# Patient Record
Sex: FEMALE | Race: ASIAN | NOT HISPANIC OR LATINO
[De-identification: names, ages, dates, MRNs, and addresses within clinical notes are randomized per-mention and may not be internally consistent; named-entity substitution may affect disease eponyms.]

---

## 2023-10-25 PROBLEM — Z00.00 ENCOUNTER FOR PREVENTIVE HEALTH EXAMINATION: Status: ACTIVE | Noted: 2023-10-25

## 2023-10-30 ENCOUNTER — APPOINTMENT (OUTPATIENT)
Dept: OBGYN | Facility: CLINIC | Age: 28
End: 2023-10-30
Payer: MEDICAID

## 2023-10-30 ENCOUNTER — ASOB RESULT (OUTPATIENT)
Age: 28
End: 2023-10-30

## 2023-10-30 PROCEDURE — 76817 TRANSVAGINAL US OBSTETRIC: CPT

## 2023-12-10 ENCOUNTER — EMERGENCY (EMERGENCY)
Facility: HOSPITAL | Age: 28
LOS: 1 days | Discharge: HOME | End: 2023-12-10
Attending: EMERGENCY MEDICINE
Payer: MEDICAID

## 2023-12-10 VITALS
SYSTOLIC BLOOD PRESSURE: 102 MMHG | DIASTOLIC BLOOD PRESSURE: 68 MMHG | RESPIRATION RATE: 18 BRPM | TEMPERATURE: 98 F | OXYGEN SATURATION: 98 % | HEART RATE: 85 BPM

## 2023-12-10 VITALS
DIASTOLIC BLOOD PRESSURE: 77 MMHG | HEIGHT: 62 IN | HEART RATE: 95 BPM | WEIGHT: 145.06 LBS | RESPIRATION RATE: 20 BRPM | OXYGEN SATURATION: 99 % | TEMPERATURE: 98 F | SYSTOLIC BLOOD PRESSURE: 107 MMHG

## 2023-12-10 LAB
ALBUMIN SERPL ELPH-MCNC: 4.2 G/DL — SIGNIFICANT CHANGE UP (ref 3.3–5)
ALBUMIN SERPL ELPH-MCNC: 4.2 G/DL — SIGNIFICANT CHANGE UP (ref 3.3–5)
ALP SERPL-CCNC: 57 U/L — SIGNIFICANT CHANGE UP (ref 40–120)
ALP SERPL-CCNC: 57 U/L — SIGNIFICANT CHANGE UP (ref 40–120)
ALT FLD-CCNC: 54 U/L — HIGH (ref 10–45)
ALT FLD-CCNC: 54 U/L — HIGH (ref 10–45)
ANION GAP SERPL CALC-SCNC: 14 MMOL/L — SIGNIFICANT CHANGE UP (ref 5–17)
ANION GAP SERPL CALC-SCNC: 14 MMOL/L — SIGNIFICANT CHANGE UP (ref 5–17)
APPEARANCE UR: ABNORMAL
APPEARANCE UR: ABNORMAL
APTT BLD: 27.8 SEC — SIGNIFICANT CHANGE UP (ref 24.5–35.6)
APTT BLD: 27.8 SEC — SIGNIFICANT CHANGE UP (ref 24.5–35.6)
AST SERPL-CCNC: 34 U/L — SIGNIFICANT CHANGE UP (ref 10–40)
AST SERPL-CCNC: 34 U/L — SIGNIFICANT CHANGE UP (ref 10–40)
BACTERIA # UR AUTO: ABNORMAL /HPF
BACTERIA # UR AUTO: ABNORMAL /HPF
BASE EXCESS BLDV CALC-SCNC: -3 MMOL/L — LOW (ref -2–3)
BASE EXCESS BLDV CALC-SCNC: -3 MMOL/L — LOW (ref -2–3)
BASOPHILS # BLD AUTO: 0.04 K/UL — SIGNIFICANT CHANGE UP (ref 0–0.2)
BASOPHILS # BLD AUTO: 0.04 K/UL — SIGNIFICANT CHANGE UP (ref 0–0.2)
BASOPHILS NFR BLD AUTO: 0.4 % — SIGNIFICANT CHANGE UP (ref 0–2)
BASOPHILS NFR BLD AUTO: 0.4 % — SIGNIFICANT CHANGE UP (ref 0–2)
BILIRUB SERPL-MCNC: 0.4 MG/DL — SIGNIFICANT CHANGE UP (ref 0.2–1.2)
BILIRUB SERPL-MCNC: 0.4 MG/DL — SIGNIFICANT CHANGE UP (ref 0.2–1.2)
BILIRUB UR-MCNC: NEGATIVE — SIGNIFICANT CHANGE UP
BILIRUB UR-MCNC: NEGATIVE — SIGNIFICANT CHANGE UP
BUN SERPL-MCNC: 6 MG/DL — LOW (ref 7–23)
BUN SERPL-MCNC: 6 MG/DL — LOW (ref 7–23)
CA-I SERPL-SCNC: 1.19 MMOL/L — SIGNIFICANT CHANGE UP (ref 1.15–1.33)
CA-I SERPL-SCNC: 1.19 MMOL/L — SIGNIFICANT CHANGE UP (ref 1.15–1.33)
CALCIUM SERPL-MCNC: 9.5 MG/DL — SIGNIFICANT CHANGE UP (ref 8.4–10.5)
CALCIUM SERPL-MCNC: 9.5 MG/DL — SIGNIFICANT CHANGE UP (ref 8.4–10.5)
CAST: 1 /LPF — SIGNIFICANT CHANGE UP (ref 0–4)
CAST: 1 /LPF — SIGNIFICANT CHANGE UP (ref 0–4)
CHLORIDE BLDV-SCNC: 105 MMOL/L — SIGNIFICANT CHANGE UP (ref 96–108)
CHLORIDE BLDV-SCNC: 105 MMOL/L — SIGNIFICANT CHANGE UP (ref 96–108)
CHLORIDE SERPL-SCNC: 102 MMOL/L — SIGNIFICANT CHANGE UP (ref 96–108)
CHLORIDE SERPL-SCNC: 102 MMOL/L — SIGNIFICANT CHANGE UP (ref 96–108)
CO2 BLDV-SCNC: 22 MMOL/L — SIGNIFICANT CHANGE UP (ref 22–26)
CO2 BLDV-SCNC: 22 MMOL/L — SIGNIFICANT CHANGE UP (ref 22–26)
CO2 SERPL-SCNC: 19 MMOL/L — LOW (ref 22–31)
CO2 SERPL-SCNC: 19 MMOL/L — LOW (ref 22–31)
COLOR SPEC: YELLOW — SIGNIFICANT CHANGE UP
COLOR SPEC: YELLOW — SIGNIFICANT CHANGE UP
CREAT SERPL-MCNC: 0.51 MG/DL — SIGNIFICANT CHANGE UP (ref 0.5–1.3)
CREAT SERPL-MCNC: 0.51 MG/DL — SIGNIFICANT CHANGE UP (ref 0.5–1.3)
DIFF PNL FLD: NEGATIVE — SIGNIFICANT CHANGE UP
DIFF PNL FLD: NEGATIVE — SIGNIFICANT CHANGE UP
EGFR: 129 ML/MIN/1.73M2 — SIGNIFICANT CHANGE UP
EGFR: 129 ML/MIN/1.73M2 — SIGNIFICANT CHANGE UP
EOSINOPHIL # BLD AUTO: 0.18 K/UL — SIGNIFICANT CHANGE UP (ref 0–0.5)
EOSINOPHIL # BLD AUTO: 0.18 K/UL — SIGNIFICANT CHANGE UP (ref 0–0.5)
EOSINOPHIL NFR BLD AUTO: 1.8 % — SIGNIFICANT CHANGE UP (ref 0–6)
EOSINOPHIL NFR BLD AUTO: 1.8 % — SIGNIFICANT CHANGE UP (ref 0–6)
FLUAV AG NPH QL: SIGNIFICANT CHANGE UP
FLUAV AG NPH QL: SIGNIFICANT CHANGE UP
FLUBV AG NPH QL: SIGNIFICANT CHANGE UP
FLUBV AG NPH QL: SIGNIFICANT CHANGE UP
GAS PNL BLDV: 135 MMOL/L — LOW (ref 136–145)
GAS PNL BLDV: 135 MMOL/L — LOW (ref 136–145)
GAS PNL BLDV: SIGNIFICANT CHANGE UP
GAS PNL BLDV: SIGNIFICANT CHANGE UP
GLUCOSE BLDV-MCNC: 79 MG/DL — SIGNIFICANT CHANGE UP (ref 70–99)
GLUCOSE BLDV-MCNC: 79 MG/DL — SIGNIFICANT CHANGE UP (ref 70–99)
GLUCOSE SERPL-MCNC: 79 MG/DL — SIGNIFICANT CHANGE UP (ref 70–99)
GLUCOSE SERPL-MCNC: 79 MG/DL — SIGNIFICANT CHANGE UP (ref 70–99)
GLUCOSE UR QL: NEGATIVE MG/DL — SIGNIFICANT CHANGE UP
GLUCOSE UR QL: NEGATIVE MG/DL — SIGNIFICANT CHANGE UP
HCG SERPL-ACNC: HIGH MIU/ML
HCG SERPL-ACNC: HIGH MIU/ML
HCO3 BLDV-SCNC: 21 MMOL/L — LOW (ref 22–29)
HCO3 BLDV-SCNC: 21 MMOL/L — LOW (ref 22–29)
HCT VFR BLD CALC: 37 % — SIGNIFICANT CHANGE UP (ref 34.5–45)
HCT VFR BLD CALC: 37 % — SIGNIFICANT CHANGE UP (ref 34.5–45)
HCT VFR BLDA CALC: 37 % — SIGNIFICANT CHANGE UP (ref 34.5–46.5)
HCT VFR BLDA CALC: 37 % — SIGNIFICANT CHANGE UP (ref 34.5–46.5)
HGB BLD CALC-MCNC: 12.3 G/DL — SIGNIFICANT CHANGE UP (ref 11.7–16.1)
HGB BLD CALC-MCNC: 12.3 G/DL — SIGNIFICANT CHANGE UP (ref 11.7–16.1)
HGB BLD-MCNC: 12.5 G/DL — SIGNIFICANT CHANGE UP (ref 11.5–15.5)
HGB BLD-MCNC: 12.5 G/DL — SIGNIFICANT CHANGE UP (ref 11.5–15.5)
IMM GRANULOCYTES NFR BLD AUTO: 0.8 % — SIGNIFICANT CHANGE UP (ref 0–0.9)
IMM GRANULOCYTES NFR BLD AUTO: 0.8 % — SIGNIFICANT CHANGE UP (ref 0–0.9)
INR BLD: 1.11 RATIO — SIGNIFICANT CHANGE UP (ref 0.85–1.18)
INR BLD: 1.11 RATIO — SIGNIFICANT CHANGE UP (ref 0.85–1.18)
KETONES UR-MCNC: 80 MG/DL
KETONES UR-MCNC: 80 MG/DL
LACTATE BLDV-MCNC: 2.2 MMOL/L — HIGH (ref 0.5–2)
LACTATE BLDV-MCNC: 2.2 MMOL/L — HIGH (ref 0.5–2)
LEUKOCYTE ESTERASE UR-ACNC: NEGATIVE — SIGNIFICANT CHANGE UP
LEUKOCYTE ESTERASE UR-ACNC: NEGATIVE — SIGNIFICANT CHANGE UP
LIDOCAIN IGE QN: 53 U/L — SIGNIFICANT CHANGE UP (ref 7–60)
LIDOCAIN IGE QN: 53 U/L — SIGNIFICANT CHANGE UP (ref 7–60)
LYMPHOCYTES # BLD AUTO: 2.45 K/UL — SIGNIFICANT CHANGE UP (ref 1–3.3)
LYMPHOCYTES # BLD AUTO: 2.45 K/UL — SIGNIFICANT CHANGE UP (ref 1–3.3)
LYMPHOCYTES # BLD AUTO: 24.9 % — SIGNIFICANT CHANGE UP (ref 13–44)
LYMPHOCYTES # BLD AUTO: 24.9 % — SIGNIFICANT CHANGE UP (ref 13–44)
MCHC RBC-ENTMCNC: 29.3 PG — SIGNIFICANT CHANGE UP (ref 27–34)
MCHC RBC-ENTMCNC: 29.3 PG — SIGNIFICANT CHANGE UP (ref 27–34)
MCHC RBC-ENTMCNC: 33.8 GM/DL — SIGNIFICANT CHANGE UP (ref 32–36)
MCHC RBC-ENTMCNC: 33.8 GM/DL — SIGNIFICANT CHANGE UP (ref 32–36)
MCV RBC AUTO: 86.9 FL — SIGNIFICANT CHANGE UP (ref 80–100)
MCV RBC AUTO: 86.9 FL — SIGNIFICANT CHANGE UP (ref 80–100)
MONOCYTES # BLD AUTO: 0.66 K/UL — SIGNIFICANT CHANGE UP (ref 0–0.9)
MONOCYTES # BLD AUTO: 0.66 K/UL — SIGNIFICANT CHANGE UP (ref 0–0.9)
MONOCYTES NFR BLD AUTO: 6.7 % — SIGNIFICANT CHANGE UP (ref 2–14)
MONOCYTES NFR BLD AUTO: 6.7 % — SIGNIFICANT CHANGE UP (ref 2–14)
NEUTROPHILS # BLD AUTO: 6.41 K/UL — SIGNIFICANT CHANGE UP (ref 1.8–7.4)
NEUTROPHILS # BLD AUTO: 6.41 K/UL — SIGNIFICANT CHANGE UP (ref 1.8–7.4)
NEUTROPHILS NFR BLD AUTO: 65.4 % — SIGNIFICANT CHANGE UP (ref 43–77)
NEUTROPHILS NFR BLD AUTO: 65.4 % — SIGNIFICANT CHANGE UP (ref 43–77)
NITRITE UR-MCNC: NEGATIVE — SIGNIFICANT CHANGE UP
NITRITE UR-MCNC: NEGATIVE — SIGNIFICANT CHANGE UP
NRBC # BLD: 0 /100 WBCS — SIGNIFICANT CHANGE UP (ref 0–0)
NRBC # BLD: 0 /100 WBCS — SIGNIFICANT CHANGE UP (ref 0–0)
PCO2 BLDV: 33 MMHG — LOW (ref 39–42)
PCO2 BLDV: 33 MMHG — LOW (ref 39–42)
PH BLDV: 7.41 — SIGNIFICANT CHANGE UP (ref 7.32–7.43)
PH BLDV: 7.41 — SIGNIFICANT CHANGE UP (ref 7.32–7.43)
PH UR: 6.5 — SIGNIFICANT CHANGE UP (ref 5–8)
PH UR: 6.5 — SIGNIFICANT CHANGE UP (ref 5–8)
PLATELET # BLD AUTO: 245 K/UL — SIGNIFICANT CHANGE UP (ref 150–400)
PLATELET # BLD AUTO: 245 K/UL — SIGNIFICANT CHANGE UP (ref 150–400)
PO2 BLDV: 41 MMHG — SIGNIFICANT CHANGE UP (ref 25–45)
PO2 BLDV: 41 MMHG — SIGNIFICANT CHANGE UP (ref 25–45)
POTASSIUM BLDV-SCNC: 3.4 MMOL/L — LOW (ref 3.5–5.1)
POTASSIUM BLDV-SCNC: 3.4 MMOL/L — LOW (ref 3.5–5.1)
POTASSIUM SERPL-MCNC: 3.8 MMOL/L — SIGNIFICANT CHANGE UP (ref 3.5–5.3)
POTASSIUM SERPL-MCNC: 3.8 MMOL/L — SIGNIFICANT CHANGE UP (ref 3.5–5.3)
POTASSIUM SERPL-SCNC: 3.8 MMOL/L — SIGNIFICANT CHANGE UP (ref 3.5–5.3)
POTASSIUM SERPL-SCNC: 3.8 MMOL/L — SIGNIFICANT CHANGE UP (ref 3.5–5.3)
PROT SERPL-MCNC: 7.4 G/DL — SIGNIFICANT CHANGE UP (ref 6–8.3)
PROT SERPL-MCNC: 7.4 G/DL — SIGNIFICANT CHANGE UP (ref 6–8.3)
PROT UR-MCNC: SIGNIFICANT CHANGE UP MG/DL
PROT UR-MCNC: SIGNIFICANT CHANGE UP MG/DL
PROTHROM AB SERPL-ACNC: 11.6 SEC — SIGNIFICANT CHANGE UP (ref 9.5–13)
PROTHROM AB SERPL-ACNC: 11.6 SEC — SIGNIFICANT CHANGE UP (ref 9.5–13)
RBC # BLD: 4.26 M/UL — SIGNIFICANT CHANGE UP (ref 3.8–5.2)
RBC # BLD: 4.26 M/UL — SIGNIFICANT CHANGE UP (ref 3.8–5.2)
RBC # FLD: 14.2 % — SIGNIFICANT CHANGE UP (ref 10.3–14.5)
RBC # FLD: 14.2 % — SIGNIFICANT CHANGE UP (ref 10.3–14.5)
RBC CASTS # UR COMP ASSIST: 8 /HPF — HIGH (ref 0–4)
RBC CASTS # UR COMP ASSIST: 8 /HPF — HIGH (ref 0–4)
REVIEW: SIGNIFICANT CHANGE UP
REVIEW: SIGNIFICANT CHANGE UP
SAO2 % BLDV: 65 % — LOW (ref 67–88)
SAO2 % BLDV: 65 % — LOW (ref 67–88)
SARS-COV-2 RNA SPEC QL NAA+PROBE: SIGNIFICANT CHANGE UP
SARS-COV-2 RNA SPEC QL NAA+PROBE: SIGNIFICANT CHANGE UP
SODIUM SERPL-SCNC: 135 MMOL/L — SIGNIFICANT CHANGE UP (ref 135–145)
SODIUM SERPL-SCNC: 135 MMOL/L — SIGNIFICANT CHANGE UP (ref 135–145)
SP GR SPEC: 1.01 — SIGNIFICANT CHANGE UP (ref 1–1.03)
SP GR SPEC: 1.01 — SIGNIFICANT CHANGE UP (ref 1–1.03)
SQUAMOUS # UR AUTO: 11 /HPF — HIGH (ref 0–5)
SQUAMOUS # UR AUTO: 11 /HPF — HIGH (ref 0–5)
UROBILINOGEN FLD QL: 0.2 MG/DL — SIGNIFICANT CHANGE UP (ref 0.2–1)
UROBILINOGEN FLD QL: 0.2 MG/DL — SIGNIFICANT CHANGE UP (ref 0.2–1)
WBC # BLD: 9.82 K/UL — SIGNIFICANT CHANGE UP (ref 3.8–10.5)
WBC # BLD: 9.82 K/UL — SIGNIFICANT CHANGE UP (ref 3.8–10.5)
WBC # FLD AUTO: 9.82 K/UL — SIGNIFICANT CHANGE UP (ref 3.8–10.5)
WBC # FLD AUTO: 9.82 K/UL — SIGNIFICANT CHANGE UP (ref 3.8–10.5)
WBC UR QL: 7 /HPF — HIGH (ref 0–5)
WBC UR QL: 7 /HPF — HIGH (ref 0–5)

## 2023-12-10 PROCEDURE — 85014 HEMATOCRIT: CPT

## 2023-12-10 PROCEDURE — 82947 ASSAY GLUCOSE BLOOD QUANT: CPT

## 2023-12-10 PROCEDURE — 80053 COMPREHEN METABOLIC PANEL: CPT

## 2023-12-10 PROCEDURE — 83735 ASSAY OF MAGNESIUM: CPT

## 2023-12-10 PROCEDURE — 36415 COLL VENOUS BLD VENIPUNCTURE: CPT

## 2023-12-10 PROCEDURE — 87086 URINE CULTURE/COLONY COUNT: CPT

## 2023-12-10 PROCEDURE — 93005 ELECTROCARDIOGRAM TRACING: CPT

## 2023-12-10 PROCEDURE — 84702 CHORIONIC GONADOTROPIN TEST: CPT

## 2023-12-10 PROCEDURE — 85025 COMPLETE CBC W/AUTO DIFF WBC: CPT

## 2023-12-10 PROCEDURE — 76705 ECHO EXAM OF ABDOMEN: CPT

## 2023-12-10 PROCEDURE — 96374 THER/PROPH/DIAG INJ IV PUSH: CPT

## 2023-12-10 PROCEDURE — 85018 HEMOGLOBIN: CPT

## 2023-12-10 PROCEDURE — 83690 ASSAY OF LIPASE: CPT

## 2023-12-10 PROCEDURE — 81001 URINALYSIS AUTO W/SCOPE: CPT

## 2023-12-10 PROCEDURE — 76705 ECHO EXAM OF ABDOMEN: CPT | Mod: 26

## 2023-12-10 PROCEDURE — 82435 ASSAY OF BLOOD CHLORIDE: CPT

## 2023-12-10 PROCEDURE — 84132 ASSAY OF SERUM POTASSIUM: CPT

## 2023-12-10 PROCEDURE — 83605 ASSAY OF LACTIC ACID: CPT

## 2023-12-10 PROCEDURE — 84295 ASSAY OF SERUM SODIUM: CPT

## 2023-12-10 PROCEDURE — 82330 ASSAY OF CALCIUM: CPT

## 2023-12-10 PROCEDURE — 82803 BLOOD GASES ANY COMBINATION: CPT

## 2023-12-10 PROCEDURE — 99285 EMERGENCY DEPT VISIT HI MDM: CPT

## 2023-12-10 PROCEDURE — 85610 PROTHROMBIN TIME: CPT

## 2023-12-10 PROCEDURE — 87637 SARSCOV2&INF A&B&RSV AMP PRB: CPT

## 2023-12-10 PROCEDURE — 99285 EMERGENCY DEPT VISIT HI MDM: CPT | Mod: 25

## 2023-12-10 PROCEDURE — 85730 THROMBOPLASTIN TIME PARTIAL: CPT

## 2023-12-10 RX ORDER — ONDANSETRON 8 MG/1
4 TABLET, FILM COATED ORAL ONCE
Refills: 0 | Status: COMPLETED | OUTPATIENT
Start: 2023-12-10 | End: 2023-12-10

## 2023-12-10 RX ORDER — SODIUM CHLORIDE 9 MG/ML
1000 INJECTION INTRAMUSCULAR; INTRAVENOUS; SUBCUTANEOUS ONCE
Refills: 0 | Status: COMPLETED | OUTPATIENT
Start: 2023-12-10 | End: 2023-12-10

## 2023-12-10 RX ORDER — ONDANSETRON 8 MG/1
1 TABLET, FILM COATED ORAL
Qty: 28 | Refills: 0
Start: 2023-12-10 | End: 2023-12-16

## 2023-12-10 RX ORDER — CEPHALEXIN 500 MG
500 CAPSULE ORAL ONCE
Refills: 0 | Status: COMPLETED | OUTPATIENT
Start: 2023-12-10 | End: 2023-12-10

## 2023-12-10 RX ORDER — CEPHALEXIN 500 MG
1 CAPSULE ORAL
Qty: 14 | Refills: 0
Start: 2023-12-10 | End: 2023-12-16

## 2023-12-10 RX ORDER — SODIUM CHLORIDE 9 MG/ML
1000 INJECTION, SOLUTION INTRAVENOUS ONCE
Refills: 0 | Status: COMPLETED | OUTPATIENT
Start: 2023-12-10 | End: 2023-12-10

## 2023-12-10 RX ADMIN — SODIUM CHLORIDE 1000 MILLILITER(S): 9 INJECTION INTRAMUSCULAR; INTRAVENOUS; SUBCUTANEOUS at 08:34

## 2023-12-10 RX ADMIN — SODIUM CHLORIDE 1000 MILLILITER(S): 9 INJECTION, SOLUTION INTRAVENOUS at 11:32

## 2023-12-10 RX ADMIN — Medication 500 MILLIGRAM(S): at 14:24

## 2023-12-10 RX ADMIN — ONDANSETRON 4 MILLIGRAM(S): 8 TABLET, FILM COATED ORAL at 08:34

## 2023-12-10 NOTE — ED ADULT TRIAGE NOTE - NS ED TRIAGE AVPU SCALE
not applicable (Male) Alert-The patient is alert, awake and responds to voice. The patient is oriented to time, place, and person. The triage nurse is able to obtain subjective information.

## 2023-12-10 NOTE — ED PROVIDER NOTE - CLINICAL SUMMARY MEDICAL DECISION MAKING FREE TEXT BOX
Attending Michell Paez: 28-year-old female approximately 15 weeks pregnant with uncomplicated pregnancy so far presenting with nausea, vomiting and diarrhea as well as some lightheadedness and dizziness.  Patient has had multiple episodes of vomiting and diarrhea for the last couple of days.  No sick contacts.  No recent travel.  No recent antibiotic use.  Patient denies any fevers but does report some increased congestion.  Had her last ultrasound 2 days ago and everything was reportedly normal.  Upon arrival patient hemodynamically stable.  On exam abdomen is soft and nontender.  No right lower quadrant tenderness to palpation, negative Mendoza's and negative McBurney sign.  Concern for possible enteritis versus gastroenteritis, less likely acute appendicitis.  Point-of-care ultrasound performed showing fetal heart rate at approximately 150 bpm.  Low risk for C. difficile.  Will obtain labs, give antiemetic, hydration and reevaluate.

## 2023-12-10 NOTE — ED PROVIDER NOTE - NSFOLLOWUPINSTRUCTIONS_ED_ALL_ED_FT
1. It is important to follow up with your primary care doctor in 1-2 days  it is important to follow up with your obgyn in 1-2 days.     2. bring a copy of all your results to your follow up appointments    3.  medication from pharmacy and take as instructed.     4. stay hydrated.     5. if your symptoms worsen, persist, or if any new symptoms develop, or if you experience any signs of distress, return to the ER right away.

## 2023-12-10 NOTE — ED PROVIDER NOTE - PATIENT PORTAL LINK FT
You can access the FollowMyHealth Patient Portal offered by [facility] by registering at the following website: [email or web address]. By joining [employer]’s FollowMyHealth portal, you will also be able to view your health information using other applications (apps) compatible with our system. You can access the FollowMyHealth Patient Portal offered by [facility] by registering at the following website: [email or web address]. By joining [employer]’s FollowMyHealth portal, you will also be able to view your health information using other applications (apps) compatible with our system.

## 2023-12-10 NOTE — ED PROVIDER NOTE - OBJECTIVE STATEMENT
29 y/o female, LMP 23, 15 weeks pregnant, , presents to the ER for nausea, vomiting and dizziness.  has been experiencing symptoms for the past week. saw her OBGYN, Dr. Molina, 2 days ago.  had US done, IUP seen. was told baby is doing well. was given Reglan for symptoms but have not provided relief. states unable to tolerate PO due to the nausea and vomiting.  experiences generalized abdominal pain which is then followed by nausea and vomiting.  also has been feeling dizzy since she is not eating or drinking. denies fever, chest pain, SOB, HA, urinary symptoms, cough, fall, leg pain/swelling, pelvic pain/cramping, vaginal bleeding.

## 2023-12-10 NOTE — ED PROVIDER NOTE - PROGRESS NOTE DETAILS
Bradley Ibrahim PA-C: labs and imaging reviewed. patient tolerating PO. reports feeling better. both zofran and phenergan sent to pharmacy, instructions on how to take medications discussed. patient advised on importance of following up with her obgyn. patient in agreement with plan. strict return precautions discussed. well appearing. stable for discharge. discussed with Dr. Paez Attending Michell Paez: pt tolerating po. urine with some ketones. pt demetrio any abdominal pain. no RLQ Pain. feeling luc after fluids. pt does not want to stay for repeat vbg. dw/ pt need to return for any worsening pain. tolerating po in the ed

## 2023-12-10 NOTE — ED PROVIDER NOTE - ATTENDING APP SHARED VISIT CONTRIBUTION OF CARE
Attending MD Michell Paez:   I personally have seen and examined this patient.  Physician assistant note reviewed and agree on plan of care and except where noted.  See HPI, PE, and MDM for details.

## 2023-12-10 NOTE — ED ADULT NURSE NOTE - NSFALLUNIVINTERV_ED_ALL_ED
[FreeTextEntry1] : I reviewed recent + today's blood work results with patient.\par \par 4/14/2021:\par WBC 15.13, hemoglobin 10.3 g/dL, hematocrit 33.1%, platelet 311,000,\par Neutrophils 32%, lymphocytes 58% \par  Bed/Stretcher in lowest position, wheels locked, appropriate side rails in place/Call bell, personal items and telephone in reach/Instruct patient to call for assistance before getting out of bed/chair/stretcher/Non-slip footwear applied when patient is off stretcher/[place] to call system/Physically safe environment - no spills, clutter or unnecessary equipment/Purposeful proactive rounding/Room/bathroom lighting operational, light cord in reach Bed/Stretcher in lowest position, wheels locked, appropriate side rails in place/Call bell, personal items and telephone in reach/Instruct patient to call for assistance before getting out of bed/chair/stretcher/Non-slip footwear applied when patient is off stretcher/[place] to call system/Physically safe environment - no spills, clutter or unnecessary equipment/Purposeful proactive rounding/Room/bathroom lighting operational, light cord in reach

## 2023-12-10 NOTE — ED PROVIDER NOTE - EYES, MLM
[EENT/Resp Symptoms] : EENT/RESPIRATORY SYMPTOMS [Nasal congestion] : nasal congestion [___ Day(s)] : [unfilled] day(s) [Intermittent] : intermittent [Active] : active [Clear rhinorrhea] : clear rhinorrhea [At Night] : at night [Fever] : no fever [Change in sleep] : no change in sleep  [Eye Redness] : no eye redness [Eye Discharge] : no eye discharge [Eye Itching] : no eye itching [Ear Pain] : no ear pain [Rhinorrhea] : rhinorrhea [Nasal Congestion] : nasal congestion [Sore Throat] : sore throat [Palpitations] : no palpitations [Chest Pain] : no chest pain [Cough] : cough [Wheezing] : no wheezing [Shortness of Breath] : no shortness of breath [Tachypnea] : no tachypnea [Decreased Appetite] : decreased appetite [Posttussive emesis] : no posttussive emesis [Vomiting] : no vomiting [Diarrhea] : no diarrhea [Decreased Urine Output] : no decreased urine output [Rash] : no rash [Max Temp: ____] : Max temperature: [unfilled] [Stable] : stable Clear bilaterally, pupils equal, round and reactive to light.

## 2023-12-10 NOTE — ED ADULT NURSE NOTE - OBJECTIVE STATEMENT
27 y/o F  15 weeks pregnant AMARJIT 21 with no pertinent medical hx PMHx  with c/o severe nausea and vomiting. Pt states symptoms have been consistent for past two days. pt also reports some lower abdominal pain as result of vomiting. pt also reports not being able to tolerate po intake due to symptoms resulting in dizziness. pt is  A&Ox4  able to follow all commands. Pulse, motor, sensation present and equal in all 4 extremities. Denies HA, blurry vision. Respirations spontaneous and unlabored on room air. Denies SOB, dyspnea, cough, CP, palpitations. On CM, NSR. Denies  Abd soft.  Denies urinary symptoms. Denies fever, chills. No sick contacts. Skin intact, warm, dry, normal for race. 29 y/o F  15 weeks pregnant AMARJIT 21 with no pertinent medical hx PMHx  with c/o severe nausea and vomiting. Pt states symptoms have been consistent for past two days. pt also reports some lower abdominal pain as result of vomiting. pt also reports not being able to tolerate po intake due to symptoms resulting in dizziness. pt is  A&Ox4  able to follow all commands. Pulse, motor, sensation present and equal in all 4 extremities. Denies HA, blurry vision. Respirations spontaneous and unlabored on room air. Denies SOB, dyspnea, cough, CP, palpitations. On CM, NSR. Denies  Abd soft.  Denies urinary symptoms. Denies fever, chills. No sick contacts. Skin intact, warm, dry, normal for race.

## 2023-12-10 NOTE — ED PROVIDER NOTE - PHYSICAL EXAMINATION
complains of pain/discomfort Attending Michell Paez: Gen: NAD, heent: atrauamtic, eomi, perrla, mmm, op pink, uvula midline, neck; nttp, no nuchal rigidity, chest: nttp, no crepitus, cv: rrr, no murmurs, lungs: ctab, abd: soft, nontender, nondistended, no peritoneal signs, no RLQ pain no guarding, ext: wwp, neg homans, skin: no rash, neuro: awake and alert, following commands, speech clear, sensation and strength intact, no focal deficits

## 2023-12-11 LAB
CULTURE RESULTS: SIGNIFICANT CHANGE UP
CULTURE RESULTS: SIGNIFICANT CHANGE UP
SPECIMEN SOURCE: SIGNIFICANT CHANGE UP
SPECIMEN SOURCE: SIGNIFICANT CHANGE UP

## 2024-05-07 ENCOUNTER — RESULT REVIEW (OUTPATIENT)
Age: 29
End: 2024-05-07

## 2024-05-07 ENCOUNTER — OUTPATIENT (OUTPATIENT)
Dept: OUTPATIENT SERVICES | Facility: HOSPITAL | Age: 29
LOS: 1 days | End: 2024-05-07
Payer: MEDICAID

## 2024-05-07 ENCOUNTER — TRANSCRIPTION ENCOUNTER (OUTPATIENT)
Age: 29
End: 2024-05-07

## 2024-05-07 VITALS
DIASTOLIC BLOOD PRESSURE: 69 MMHG | OXYGEN SATURATION: 98 % | TEMPERATURE: 98 F | HEART RATE: 83 BPM | SYSTOLIC BLOOD PRESSURE: 117 MMHG | RESPIRATION RATE: 16 BRPM

## 2024-05-07 DIAGNOSIS — O26.899 OTHER SPECIFIED PREGNANCY RELATED CONDITIONS, UNSPECIFIED TRIMESTER: ICD-10-CM

## 2024-05-07 PROCEDURE — 76819 FETAL BIOPHYS PROFIL W/O NST: CPT

## 2024-05-07 PROCEDURE — 76819 FETAL BIOPHYS PROFIL W/O NST: CPT | Mod: 26

## 2024-05-07 PROCEDURE — 59025 FETAL NON-STRESS TEST: CPT

## 2024-05-07 PROCEDURE — 59025 FETAL NON-STRESS TEST: CPT | Mod: 26

## 2024-05-07 PROCEDURE — G0463: CPT

## 2024-05-07 PROCEDURE — 99212 OFFICE O/P EST SF 10 MIN: CPT | Mod: 25

## 2024-05-07 NOTE — OB RN TRIAGE NOTE - NS_OBGYNHISTORY_OBGYN_ALL_OB_FT
NDKA  Medication: PNV  PMHX: denies  PSHX: denies  OBGYN: denies  Social: denies ETOH, smoking, anxiety, depression

## 2024-05-08 PROBLEM — Z78.9 OTHER SPECIFIED HEALTH STATUS: Chronic | Status: ACTIVE | Noted: 2023-12-10

## 2024-05-08 NOTE — OB PROVIDER TRIAGE NOTE - ADDITIONAL INSTRUCTIONS
tracing- moderate variability accels no decels fhr 140  cx- clp/vtx/int  bpp 8/8 immanuel- 13.4  f/u in office tomorrow for fetal assessment

## 2024-05-08 NOTE — OB PROVIDER TRIAGE NOTE - HISTORY OF PRESENT ILLNESS
30y/o   lmp 23 edc 24 at 36.4wks comes to L&D c/o decreased fetal movement since 4pm.   Now feeling fetus kicking her. Pt had an unremarkable  prenatal course. Pt denies vag bleeding, rom or contractions.

## 2024-05-08 NOTE — OB PROVIDER TRIAGE NOTE - PLAN OF CARE
tracing- moderte variability accels no decels fhr 140  cx- clp/vtx/int  follow-up in office in tomorrow. for evaluation of fetus.

## 2024-05-08 NOTE — OB PROVIDER TRIAGE NOTE - NSHPLABSRESULTS_GEN_ALL_CORE
< from: US Fetal Bio Profile w/o Non-Stress (05.07.24 @ 21:43) >    ACC: [id number] EXAM:  US OB FETALBIOPHYS WO NONSTRES   ORDERED BY: NINA ULRICH     PROCEDURE DATE:  05/07/2024          INTERPRETATION:  CLINICAL INFORMATION: Advanced pregnancy. Decreased   fetal movement.    TECHNIQUE: Transabdominal pelvic ultrasound was performed.    COMPARISON: None    FINDINGS:  A single, viable intrauterine gestation is identified in cephalic   presentation. The placenta is anterior. The amniotic fluid index measures   13.6 cm.    The fetal heart rate measures 142 beats per minute. A fluid filled   stomach and urinary bladder are demonstrated. The kidneys appear   symmeteric.    The biophysical profile was as follows:  Movement: . . . . . . . . 2  Tone: . . . . . . . . . . . . 2  Fluid: . . . . . . . . . . . . 2  Breathing: . . . . . . . . 2    IMPRESSION:  Single live intrauterine gestation in cephalic presentation.  Biophysical profile 8 out of 8.  MARCIA measures 13.6 cm.    --- End of Report ---            JENNY RHODES MD; Attending Radiologist  This document has been electronically signed. May  7 2024 10:31PM    < end of copied text >

## 2024-05-08 NOTE — OB PROVIDER TRIAGE NOTE - NSOBPROVIDERNOTE_OBGYN_ALL_OB_FT
30y/o  at 36.5wks came c/o decreased fetal movement  Pt has a reactive tracing with a bpp 8/8 immanuel 13.6, anterior vtx    abdomen- soft nt  ext from x 4 Nt

## 2024-05-08 NOTE — OB PROVIDER TRIAGE NOTE - NSHPPHYSICALEXAM_GEN_ALL_CORE
Pt is alert ox3 in NAD    abd- gravid  tracing reactive tracing moderate variability accels no decels 140   irregular contracitions  cx- clp/ vtx intact    ext from x 4

## 2024-05-09 DIAGNOSIS — O36.8130 DECREASED FETAL MOVEMENTS, THIRD TRIMESTER, NOT APPLICABLE OR UNSPECIFIED: ICD-10-CM

## 2024-05-09 DIAGNOSIS — Z3A.36 36 WEEKS GESTATION OF PREGNANCY: ICD-10-CM

## 2024-05-19 ENCOUNTER — OUTPATIENT (OUTPATIENT)
Dept: OUTPATIENT SERVICES | Facility: HOSPITAL | Age: 29
LOS: 1 days | End: 2024-05-19
Payer: MEDICAID

## 2024-05-19 VITALS
OXYGEN SATURATION: 100 % | SYSTOLIC BLOOD PRESSURE: 112 MMHG | DIASTOLIC BLOOD PRESSURE: 80 MMHG | RESPIRATION RATE: 16 BRPM | TEMPERATURE: 97 F | HEART RATE: 74 BPM

## 2024-05-19 DIAGNOSIS — O26.899 OTHER SPECIFIED PREGNANCY RELATED CONDITIONS, UNSPECIFIED TRIMESTER: ICD-10-CM

## 2024-05-19 LAB
APPEARANCE UR: CLEAR — SIGNIFICANT CHANGE UP
BILIRUB UR-MCNC: NEGATIVE — SIGNIFICANT CHANGE UP
COLOR SPEC: YELLOW — SIGNIFICANT CHANGE UP
DIFF PNL FLD: ABNORMAL
GLUCOSE UR QL: NEGATIVE MG/DL — SIGNIFICANT CHANGE UP
KETONES UR-MCNC: NEGATIVE MG/DL — SIGNIFICANT CHANGE UP
LEUKOCYTE ESTERASE UR-ACNC: NEGATIVE — SIGNIFICANT CHANGE UP
NITRITE UR-MCNC: NEGATIVE — SIGNIFICANT CHANGE UP
PH UR: 6.5 — SIGNIFICANT CHANGE UP (ref 5–8)
PROT UR-MCNC: NEGATIVE MG/DL — SIGNIFICANT CHANGE UP
SP GR SPEC: 1.01 — SIGNIFICANT CHANGE UP (ref 1–1.03)
UROBILINOGEN FLD QL: 0.2 MG/DL — SIGNIFICANT CHANGE UP (ref 0.2–1)

## 2024-05-19 PROCEDURE — G0463: CPT

## 2024-05-19 PROCEDURE — 59025 FETAL NON-STRESS TEST: CPT

## 2024-05-19 PROCEDURE — 99213 OFFICE O/P EST LOW 20 MIN: CPT

## 2024-05-19 PROCEDURE — 81001 URINALYSIS AUTO W/SCOPE: CPT

## 2024-05-19 NOTE — OB PROVIDER TRIAGE NOTE - HISTORY OF PRESENT ILLNESS
Patient is a 29 year old  at 38w2 who presents to triage with complaint of abdominal pain on the left side - where baby kicks the most, and she was worried that the baby with moving a lot today.  Denies vaginal bleeding, leakage of fluid, decreased fetal movement, abdominal or pelvic pain, or any other concerns.   PNC with Dr Molina  PMhx: Denies  Sxhx; Denies  Meds: PNV  Allergies: NKDA  OBHx: Primigravida  GynHx: normal menses, one partner, no stds, no cysts, no fibroids, normal paps  Socialhx: neg x 3, no anxiety or depression

## 2024-05-19 NOTE — OB PROVIDER TRIAGE NOTE - NSHPPHYSICALEXAM_GEN_ALL_CORE
Gen: A&Ox3, NAD  Chest: CTABL   Cardiac: S1+S2+ RRR  Abdomen: Soft, nontender, +BS, no guarding, no rebound  Extremities: Nontender, no worsening edema, DTRS 2+    Reproducible pain with pressure in area of point tenderness on upper left abdomen

## 2024-05-19 NOTE — OB PROVIDER TRIAGE NOTE - ADDITIONAL INSTRUCTIONS
Discharge home with strict precautions  Report back to triage with vaginal bleeding, leakage of fluid, decreased fetal movement, abdominal or pelvic pain, or any other concerns  Pinky rowe reviewed  Follow up in office as scheduled

## 2024-05-21 DIAGNOSIS — Z3A.38 38 WEEKS GESTATION OF PREGNANCY: ICD-10-CM

## 2024-05-21 DIAGNOSIS — O26.893 OTHER SPECIFIED PREGNANCY RELATED CONDITIONS, THIRD TRIMESTER: ICD-10-CM

## 2024-05-21 DIAGNOSIS — R10.9 UNSPECIFIED ABDOMINAL PAIN: ICD-10-CM

## 2024-05-26 ENCOUNTER — INPATIENT (INPATIENT)
Facility: HOSPITAL | Age: 29
LOS: 2 days | Discharge: HOME | DRG: 951 | End: 2024-05-29
Attending: OBSTETRICS & GYNECOLOGY | Admitting: OBSTETRICS & GYNECOLOGY
Payer: MEDICAID

## 2024-05-26 VITALS
HEART RATE: 92 BPM | TEMPERATURE: 98 F | RESPIRATION RATE: 19 BRPM | SYSTOLIC BLOOD PRESSURE: 110 MMHG | OXYGEN SATURATION: 99 % | DIASTOLIC BLOOD PRESSURE: 72 MMHG

## 2024-05-26 DIAGNOSIS — Z34.80 ENCOUNTER FOR SUPERVISION OF OTHER NORMAL PREGNANCY, UNSPECIFIED TRIMESTER: ICD-10-CM

## 2024-05-26 DIAGNOSIS — O26.899 OTHER SPECIFIED PREGNANCY RELATED CONDITIONS, UNSPECIFIED TRIMESTER: ICD-10-CM

## 2024-05-26 LAB
APTT BLD: 28 SEC — SIGNIFICANT CHANGE UP (ref 24.5–35.6)
BASOPHILS # BLD AUTO: 0.05 K/UL — SIGNIFICANT CHANGE UP (ref 0–0.2)
BASOPHILS NFR BLD AUTO: 0.4 % — SIGNIFICANT CHANGE UP (ref 0–2)
EOSINOPHIL # BLD AUTO: 0.07 K/UL — SIGNIFICANT CHANGE UP (ref 0–0.5)
EOSINOPHIL NFR BLD AUTO: 0.6 % — SIGNIFICANT CHANGE UP (ref 0–6)
FIBRINOGEN PPP-MCNC: 525 MG/DL — HIGH (ref 200–475)
HBV SURFACE AG SERPL QL IA: SIGNIFICANT CHANGE UP
HCT VFR BLD CALC: 33.2 % — LOW (ref 34.5–45)
HCV AB S/CO SERPL IA: 0.07 S/CO — SIGNIFICANT CHANGE UP (ref 0–0.99)
HCV AB SERPL-IMP: SIGNIFICANT CHANGE UP
HGB BLD-MCNC: 10.8 G/DL — LOW (ref 11.5–15.5)
HIV 1 & 2 AB SERPL IA.RAPID: SIGNIFICANT CHANGE UP
HIV 1+2 AB+HIV1 P24 AG SERPL QL IA: SIGNIFICANT CHANGE UP
IMM GRANULOCYTES NFR BLD AUTO: 0.7 % — SIGNIFICANT CHANGE UP (ref 0–0.9)
INR BLD: 0.97 RATIO — SIGNIFICANT CHANGE UP (ref 0.85–1.18)
LYMPHOCYTES # BLD AUTO: 2.81 K/UL — SIGNIFICANT CHANGE UP (ref 1–3.3)
LYMPHOCYTES # BLD AUTO: 22.5 % — SIGNIFICANT CHANGE UP (ref 13–44)
MCHC RBC-ENTMCNC: 28.3 PG — SIGNIFICANT CHANGE UP (ref 27–34)
MCHC RBC-ENTMCNC: 32.5 GM/DL — SIGNIFICANT CHANGE UP (ref 32–36)
MCV RBC AUTO: 86.9 FL — SIGNIFICANT CHANGE UP (ref 80–100)
MONOCYTES # BLD AUTO: 0.93 K/UL — HIGH (ref 0–0.9)
MONOCYTES NFR BLD AUTO: 7.4 % — SIGNIFICANT CHANGE UP (ref 2–14)
NEUTROPHILS # BLD AUTO: 8.55 K/UL — HIGH (ref 1.8–7.4)
NEUTROPHILS NFR BLD AUTO: 68.4 % — SIGNIFICANT CHANGE UP (ref 43–77)
NRBC # BLD: 0 /100 WBCS — SIGNIFICANT CHANGE UP (ref 0–0)
PLATELET # BLD AUTO: 229 K/UL — SIGNIFICANT CHANGE UP (ref 150–400)
PROTHROM AB SERPL-ACNC: 11.1 SEC — SIGNIFICANT CHANGE UP (ref 9.5–13)
RBC # BLD: 3.82 M/UL — SIGNIFICANT CHANGE UP (ref 3.8–5.2)
RBC # FLD: 16.4 % — HIGH (ref 10.3–14.5)
WBC # BLD: 12.5 K/UL — HIGH (ref 3.8–10.5)
WBC # FLD AUTO: 12.5 K/UL — HIGH (ref 3.8–10.5)

## 2024-05-26 RX ORDER — SODIUM CHLORIDE 9 MG/ML
1000 INJECTION, SOLUTION INTRAVENOUS
Refills: 0 | Status: DISCONTINUED | OUTPATIENT
Start: 2024-05-26 | End: 2024-05-26

## 2024-05-26 RX ORDER — HEPARIN SODIUM 5000 [USP'U]/ML
5000 INJECTION INTRAVENOUS; SUBCUTANEOUS EVERY 12 HOURS
Refills: 0 | Status: DISCONTINUED | OUTPATIENT
Start: 2024-05-27 | End: 2024-05-29

## 2024-05-26 RX ORDER — ACETAMINOPHEN 500 MG
975 TABLET ORAL EVERY 6 HOURS
Refills: 0 | Status: DISCONTINUED | OUTPATIENT
Start: 2024-05-26 | End: 2024-05-29

## 2024-05-26 RX ORDER — KETOROLAC TROMETHAMINE 30 MG/ML
30 SYRINGE (ML) INJECTION EVERY 6 HOURS
Refills: 0 | Status: DISCONTINUED | OUTPATIENT
Start: 2024-05-26 | End: 2024-05-27

## 2024-05-26 RX ORDER — FAMOTIDINE 10 MG/ML
20 INJECTION INTRAVENOUS ONCE
Refills: 0 | Status: DISCONTINUED | OUTPATIENT
Start: 2024-05-26 | End: 2024-05-29

## 2024-05-26 RX ORDER — FOLIC ACID 0.8 MG
1 TABLET ORAL DAILY
Refills: 0 | Status: DISCONTINUED | OUTPATIENT
Start: 2024-05-26 | End: 2024-05-29

## 2024-05-26 RX ORDER — AZITHROMYCIN 500 MG/1
500 TABLET, FILM COATED ORAL ONCE
Refills: 0 | Status: COMPLETED | OUTPATIENT
Start: 2024-05-26 | End: 2024-05-26

## 2024-05-26 RX ORDER — CEFAZOLIN SODIUM 1 G
2000 VIAL (EA) INJECTION ONCE
Refills: 0 | Status: COMPLETED | OUTPATIENT
Start: 2024-05-26 | End: 2024-05-26

## 2024-05-26 RX ORDER — ONDANSETRON 8 MG/1
4 TABLET, FILM COATED ORAL EVERY 4 HOURS
Refills: 0 | Status: DISCONTINUED | OUTPATIENT
Start: 2024-05-26 | End: 2024-05-29

## 2024-05-26 RX ORDER — OXYTOCIN 10 UNIT/ML
333.33 VIAL (ML) INJECTION
Qty: 20 | Refills: 0 | Status: COMPLETED | OUTPATIENT
Start: 2024-05-26

## 2024-05-26 RX ORDER — CHLORHEXIDINE GLUCONATE 213 G/1000ML
1 SOLUTION TOPICAL DAILY
Refills: 0 | Status: DISCONTINUED | OUTPATIENT
Start: 2024-05-26 | End: 2024-05-26

## 2024-05-26 RX ORDER — IBUPROFEN 200 MG
600 TABLET ORAL EVERY 6 HOURS
Refills: 0 | Status: COMPLETED | OUTPATIENT
Start: 2024-05-26 | End: 2025-04-24

## 2024-05-26 RX ORDER — MAGNESIUM HYDROXIDE 400 MG/1
30 TABLET, CHEWABLE ORAL
Refills: 0 | Status: DISCONTINUED | OUTPATIENT
Start: 2024-05-26 | End: 2024-05-29

## 2024-05-26 RX ORDER — CITRIC ACID/SODIUM CITRATE 300-500 MG
30 SOLUTION, ORAL ORAL ONCE
Refills: 0 | Status: COMPLETED | OUTPATIENT
Start: 2024-05-26 | End: 2024-05-26

## 2024-05-26 RX ORDER — OXYTOCIN 10 UNIT/ML
2 VIAL (ML) INJECTION
Qty: 30 | Refills: 0 | Status: DISCONTINUED | OUTPATIENT
Start: 2024-05-26 | End: 2024-05-29

## 2024-05-26 RX ORDER — DIPHENHYDRAMINE HCL 50 MG
25 CAPSULE ORAL EVERY 6 HOURS
Refills: 0 | Status: DISCONTINUED | OUTPATIENT
Start: 2024-05-26 | End: 2024-05-29

## 2024-05-26 RX ORDER — SIMETHICONE 80 MG/1
80 TABLET, CHEWABLE ORAL EVERY 4 HOURS
Refills: 0 | Status: DISCONTINUED | OUTPATIENT
Start: 2024-05-26 | End: 2024-05-29

## 2024-05-26 RX ORDER — TETANUS TOXOID, REDUCED DIPHTHERIA TOXOID AND ACELLULAR PERTUSSIS VACCINE, ADSORBED 5; 2.5; 8; 8; 2.5 [IU]/.5ML; [IU]/.5ML; UG/.5ML; UG/.5ML; UG/.5ML
0.5 SUSPENSION INTRAMUSCULAR ONCE
Refills: 0 | Status: DISCONTINUED | OUTPATIENT
Start: 2024-05-26 | End: 2024-05-29

## 2024-05-26 RX ORDER — ACETAMINOPHEN 500 MG
1000 TABLET ORAL ONCE
Refills: 0 | Status: DISCONTINUED | OUTPATIENT
Start: 2024-05-26 | End: 2024-05-29

## 2024-05-26 RX ORDER — LANOLIN
1 OINTMENT (GRAM) TOPICAL EVERY 6 HOURS
Refills: 0 | Status: DISCONTINUED | OUTPATIENT
Start: 2024-05-26 | End: 2024-05-29

## 2024-05-26 RX ORDER — NALOXONE HYDROCHLORIDE 4 MG/.1ML
0.1 SPRAY NASAL
Refills: 0 | Status: DISCONTINUED | OUTPATIENT
Start: 2024-05-26 | End: 2024-05-29

## 2024-05-26 RX ORDER — FERROUS SULFATE 325(65) MG
325 TABLET ORAL DAILY
Refills: 0 | Status: DISCONTINUED | OUTPATIENT
Start: 2024-05-26 | End: 2024-05-29

## 2024-05-26 RX ORDER — OXYTOCIN 10 UNIT/ML
333.33 VIAL (ML) INJECTION
Qty: 20 | Refills: 0 | Status: DISCONTINUED | OUTPATIENT
Start: 2024-05-26 | End: 2024-05-29

## 2024-05-26 RX ORDER — SODIUM CHLORIDE 9 MG/ML
1000 INJECTION, SOLUTION INTRAVENOUS
Refills: 0 | Status: DISCONTINUED | OUTPATIENT
Start: 2024-05-26 | End: 2024-05-29

## 2024-05-26 RX ORDER — OXYCODONE HYDROCHLORIDE 5 MG/1
5 TABLET ORAL
Refills: 0 | Status: COMPLETED | OUTPATIENT
Start: 2024-05-26 | End: 2024-06-02

## 2024-05-26 RX ORDER — DEXAMETHASONE 0.5 MG/5ML
4 ELIXIR ORAL EVERY 6 HOURS
Refills: 0 | Status: DISCONTINUED | OUTPATIENT
Start: 2024-05-26 | End: 2024-05-29

## 2024-05-26 RX ADMIN — Medication 100 MILLIGRAM(S): at 13:00

## 2024-05-26 RX ADMIN — CHLORHEXIDINE GLUCONATE 1 APPLICATION(S): 213 SOLUTION TOPICAL at 11:45

## 2024-05-26 RX ADMIN — Medication 2 MILLIUNIT(S)/MIN: at 01:50

## 2024-05-26 RX ADMIN — Medication 1000 MILLIUNIT(S)/MIN: at 13:17

## 2024-05-26 RX ADMIN — AZITHROMYCIN 255 MILLIGRAM(S): 500 TABLET, FILM COATED ORAL at 11:40

## 2024-05-26 RX ADMIN — Medication 30 MILLIGRAM(S): at 23:52

## 2024-05-26 RX ADMIN — SIMETHICONE 80 MILLIGRAM(S): 80 TABLET, CHEWABLE ORAL at 23:52

## 2024-05-26 RX ADMIN — Medication 30 MILLILITER(S): at 12:08

## 2024-05-26 NOTE — OB RN DELIVERY SUMMARY - NS_SEPSISRSKCALC_OBGYN_ALL_OB_FT
EOS calculated successfully. EOS Risk Factor: 0.5/1000 live births ([facility] national incidence); GA=39w2d; Temp=97.9; ROM=14.25; GBS='Unknown'; Antibiotics='No antibiotics or any antibiotics < 2 hrs prior to birth'   EOS calculated successfully. EOS Risk Factor: 0.5/1000 live births ([facility] national incidence); GA=39w2d; Temp=100; ROM=14.25; GBS='Unknown'; Antibiotics='No antibiotics or any antibiotics < 2 hrs prior to birth'

## 2024-05-26 NOTE — CHART NOTE - NSCHARTNOTEFT_GEN_A_CORE
I explained to the patient and FOB the options of management of her labor process.  The FH monitoring is cat II and she has had prolonged decelerations that have recovered.  Furthermore, cervical dilatation has not changed in the past 4 hours and augmentation with pitocin  produces a NRFHT.  I offer her delivery by  section to avoid complications to her baby.  Patient showed understanding, all questions answered and she agrees to undergo  delivery
NST reviewed and reassuring  Restart pitocin at 2mu  d/w Dr. Caryl Molina aware
7min fetal deceleration noted  Dr. Hutson called to bedside  SVE 5-6/100/-1  PItocin discontinued  IUPC in place and ISE inserted   T&C 2 units and 2nd IV line recommended (pt previously refused)  Dr. Hutson counseled patient on fetal status and recommendation for CS in the setting of Cat 2  Pt requests to discuss with partner and primary OB.  Risks and benefits discussed.     , minimal variability   NST reveiwed with Dr. Hutson
NST reviewed  Pitocin discontinued at this time for Cat 2  Continue close monitoring  IV fluids  Dr. Hutson aware, NST reviewed

## 2024-05-26 NOTE — OB RN INTRAOPERATIVE NOTE - NSSELHIDDEN_OBGYN_ALL_OB_FT
[NS_DeliveryAttending1_OBGYN_ALL_OB_FT:[id number]==],[NS_DeliveryAssist1_OBGYN_ALL_OB_FT:[id number]==]

## 2024-05-26 NOTE — OB PROVIDER H&P - NSLOWPPHRISK_OBGYN_A_OB
No previous uterine incision/Rick Pregnancy/Less than or equal to 4 previous vaginal births/No known bleeding disorder/No history of postpartum hemorrhage/No other PPH risks indicated

## 2024-05-26 NOTE — OB RN PATIENT PROFILE - NS PRO TDAP INDICATIONS_RESTRICTED
No pregnant & post-partum women during each pregancy irregardless of the patient's prior history of receiving Tdap to maximize the maternal antibody response and passive antibody transfer to the infant

## 2024-05-26 NOTE — OB PROVIDER H&P - NS_PELVICEXAM_OBGYN_ALL_OB
Department of Anesthesiology  Postprocedure Note    Patient: Elise Angelucci  MRN: [id number]  YOB: 1952  Date of evaluation: 4/28/2021  Time:  11:57 AM     Procedure Summary     Date: 04/28/21 Room / Location: [address]    Anesthesia Start: 2464 Anesthesia Stop: 1030    Procedure: PARS PLANA VITRECTOMY 23 GAUGE, ENDOLASER 200MS 200MW 835 SPOTS, AIR FLUID EXCHANGE, AIR GAS EXCHANGE WITH 22%SF6 (Right ) Diagnosis: (RETINAL DETACHMENT RIGHT EYE)    Surgeons: Torsten Salmon MD Responsible Provider: Lupe Veras MD    Anesthesia Type: MAC ASA Status: 2          Anesthesia Type: MAC    Malinda Phase I: Malinda Score: 10    Malinda Phase II: Malinda Score: 10    Last vitals: Reviewed and per EMR flowsheets.        Anesthesia Post Evaluation    Patient location during evaluation: PACU  Patient participation: complete - patient participated  Level of consciousness: awake and alert  Pain score: 3  Airway patency: patent  Nausea & Vomiting: no nausea and no vomiting  Complications: no  Cardiovascular status: hemodynamically stable  Respiratory status: acceptable  Hydration status: euvolemic Yes

## 2024-05-26 NOTE — OB PROVIDER LABOR PROGRESS NOTE - NS_SUBJECTIVE/OBJECTIVE_OBGYN_ALL_OB_FT
Pt comfortable after epidural
30yo  siup @ 39.2wks, AOL , SROM moderate meconium  comfortable with epidural in place  Mild pelvic pressure    ICU Vital Signs Last 24 Hrs  T(C): 36.6 (26 May 2024 01:48), Max: 36.6 (26 May 2024 01:35)  T(F): 97.9 (26 May 2024 01:48), Max: 97.9 (26 May 2024 01:35)  HR: 83 (26 May 2024 01:48) (83 - 92)  BP: 112/75 (26 May 2024 01:48) (110/72 - 112/75)  RR: 17 (26 May 2024 01:48) (17 - 19)  SpO2: 100% (26 May 2024 01:48) (99% - 100%)

## 2024-05-26 NOTE — OB RN PATIENT PROFILE - NS_EDDCALCULATED_OBGYN_ALL_OB
From: Katarina Pichardo  To: Todd Xie MD  Sent: 2/12/2020 10:38 AM CST  Subject: Non-Urgent Medical Question    I have debated with myself about contacting you, but I decided I wanted to get it on record and see if you can possibly help me in the interim between now and when I can get in to see you. I did schedule an appointment but the soonest I could get in was April 30th which is almost three months from now. Here's what is happening... this past summer I started having significant heel pain. This was different from the plantar fasciitis that I have dealt with for years. For that I have used ice, stretching, foot sleeves, rest and have had periods over the years of pain and then periods where I am fine. I did not really attribute it to my PA until the pain in my heels started. So - I started having severe pain in both heels to the point that it was very difficult to walk and get around. After doing some research I believe that it is achilles tendonitis and began doing stretching, ice, etc. After many months I have seen some improvement in my left foot to where I do have days without  pain. That has not yet been the case with my right foot but I am still hopeful. At this point I do not really want to make a change with any medication. I do not like taking medication and I still feel that the Enbrel is effectively helping me. I have not had any psoriasis skin flares and other than the foot issue my other joints overall have been good. That being said, on the days where the pain is the most intense I have difficulty just walking across a room. I need to stop and/or use support. Thankfully that is not every day but it does happen often enough to be troublesome. I have put a lot of thought into contacting you about this because I didn't really want to take this step, but I would like to see if I could get a handicapped parking tag to use on the really bad days. The form to obtain this needs to be completed by my  doctor and I am hoping I don't have to wait 3 months to see you to get it. I have filled out the form and could email/send it to you in the hopes  that you could print it, complete it and mail it to me so I can sign it and submit it to the DMV. If you could just let me know if this would be acceptable and if I can email it what address I should send it to (same if you want it mailed instead). I really appreciate this and look forward to hearing back from you. Should you need to contact me directly my phone number is [phone] or personal email is [email or web address] Thanks.   LMP

## 2024-05-26 NOTE — OB RN DELIVERY SUMMARY - NSSELHIDDEN_OBGYN_ALL_OB_FT
[NS_DeliveryAttending1_OBGYN_ALL_OB_FT:[id number]==] [NS_DeliveryAttending1_OBGYN_ALL_OB_FT:[id number]==],[NS_DeliveryAssist1_OBGYN_ALL_OB_FT:[id number]==]

## 2024-05-26 NOTE — OB PROVIDER LABOR PROGRESS NOTE - NS_OBIHIFHRDETAILS_OBGYN_ALL_OB_FT
Cat 2; moderate variability FH 140s, +accels, +variables and occ late decelerations
moderate btbv  occasional  variables

## 2024-05-26 NOTE — OB PROVIDER H&P - PROBLEM SELECTOR PLAN 1
Admit and consent  IV fluids and initial bloodwork  Continuous monitoring fht, toco, vitals  Pain management as needed  Augmentation with pitocin  Continue current care       Dr Viramontes and Dr Molina aware

## 2024-05-26 NOTE — OB NEONATOLOGY/PEDIATRICIAN DELIVERY SUMMARY - NSPEDSNEONOTESA_OBGYN_ALL_OB_FT
Requested by OB to attend this primary  at 39.2 weeks.  Mother is a 29 year old, , blood type B+  .  Prenatal labs as follow: HIV neg, RPR pending, rubella pending, HBsA neg, GBS  unk (reportedly negative but nbo hard copy available). No significant maternal history. No significant prenatal history. Mother presented with SROM on  at 23:00 with light meconium.  ROM 14 hours prior to delivery. C/S for NRFHT. Infant emerged vertex (OP presentation), vigorous, with good tone. Delayed cord clamping X 60 secs, then brought to warmer. Dried, suctioned and stimulated.  Apgars 9 and 9. Infant voided in OR. Scrotum noted to be enlarged and firm on palpation; likely b/l hydrocele. Would f/u with US. Mom wishes to breast feed. Consents to Hep B vaccine. Consents to circumcision. Infant admitted to NBN for routine care. Parents updated.

## 2024-05-26 NOTE — OB PROVIDER TRIAGE NOTE - HISTORY OF PRESENT ILLNESS
Patient is a 29 year old  at 39w2d who presents to triage with complaint of leakage of fluid at 11pm.  Denies vaginal bleeding, leakage of fluid, decreased fetal movement, regular contractions or any other concerns.  PNC with Dr Molina  PMhx: Denies  Sxhx; Denies  Meds; PNV  Allergies: NKDA   OBHx: Primigravida  Gynhx: normal menses, one partner, no stds, no cysts, no fibroids, normal paps  SocialHx: neg x 3 no anxiety or depression

## 2024-05-26 NOTE — OB PROVIDER LABOR PROGRESS NOTE - ASSESSMENT
Labor augmentation-pitocin at 6mu  meconium  Anticipate vaginal delivery
28yo  siup @ 39.2wks, AOL , SROM moderate meconium  -IUPC inserted  -epidural reinforcement as needed  -continue close monitoring  -reposition  -T&C 2 units  d/w sunil Barahona

## 2024-05-26 NOTE — OB PROVIDER DELIVERY SUMMARY - NSSELHIDDEN_OBGYN_ALL_OB_FT
[NS_DeliveryAttending1_OBGYN_ALL_OB_FT:[id number]==],[NS_DeliveryAssist1_OBGYN_ALL_OB_FT:[id number]==]

## 2024-05-27 LAB
BASOPHILS # BLD AUTO: 0.08 K/UL — SIGNIFICANT CHANGE UP (ref 0–0.2)
BASOPHILS NFR BLD AUTO: 0.5 % — SIGNIFICANT CHANGE UP (ref 0–2)
EOSINOPHIL # BLD AUTO: 0.02 K/UL — SIGNIFICANT CHANGE UP (ref 0–0.5)
EOSINOPHIL NFR BLD AUTO: 0.1 % — SIGNIFICANT CHANGE UP (ref 0–6)
HCT VFR BLD CALC: 30.2 % — LOW (ref 34.5–45)
HGB BLD-MCNC: 9.9 G/DL — LOW (ref 11.5–15.5)
IMM GRANULOCYTES NFR BLD AUTO: 0.5 % — SIGNIFICANT CHANGE UP (ref 0–0.9)
LYMPHOCYTES # BLD AUTO: 1.91 K/UL — SIGNIFICANT CHANGE UP (ref 1–3.3)
LYMPHOCYTES # BLD AUTO: 13 % — SIGNIFICANT CHANGE UP (ref 13–44)
MCHC RBC-ENTMCNC: 28.3 PG — SIGNIFICANT CHANGE UP (ref 27–34)
MCHC RBC-ENTMCNC: 32.8 GM/DL — SIGNIFICANT CHANGE UP (ref 32–36)
MCV RBC AUTO: 86.3 FL — SIGNIFICANT CHANGE UP (ref 80–100)
MEV IGG SER-ACNC: <5 AU/ML — SIGNIFICANT CHANGE UP
MEV IGG+IGM SER-IMP: NEGATIVE — SIGNIFICANT CHANGE UP
MONOCYTES # BLD AUTO: 0.91 K/UL — HIGH (ref 0–0.9)
MONOCYTES NFR BLD AUTO: 6.2 % — SIGNIFICANT CHANGE UP (ref 2–14)
MUV AB SER-ACNC: NEGATIVE — SIGNIFICANT CHANGE UP
MUV IGG FLD-ACNC: <5 AU/ML — SIGNIFICANT CHANGE UP
NEUTROPHILS # BLD AUTO: 11.72 K/UL — HIGH (ref 1.8–7.4)
NEUTROPHILS NFR BLD AUTO: 79.7 % — HIGH (ref 43–77)
NRBC # BLD: 0 /100 WBCS — SIGNIFICANT CHANGE UP (ref 0–0)
PLATELET # BLD AUTO: 168 K/UL — SIGNIFICANT CHANGE UP (ref 150–400)
RBC # BLD: 3.5 M/UL — LOW (ref 3.8–5.2)
RBC # FLD: 16.6 % — HIGH (ref 10.3–14.5)
RUBV IGG SER-ACNC: 1.7 INDEX — SIGNIFICANT CHANGE UP
RUBV IGG SER-IMP: POSITIVE — SIGNIFICANT CHANGE UP
T PALLIDUM AB TITR SER: NEGATIVE — SIGNIFICANT CHANGE UP
WBC # BLD: 14.71 K/UL — HIGH (ref 3.8–10.5)
WBC # FLD AUTO: 14.71 K/UL — HIGH (ref 3.8–10.5)

## 2024-05-27 RX ORDER — OXYCODONE HYDROCHLORIDE 5 MG/1
5 TABLET ORAL
Refills: 0 | Status: DISCONTINUED | OUTPATIENT
Start: 2024-05-27 | End: 2024-05-29

## 2024-05-27 RX ORDER — IBUPROFEN 200 MG
600 TABLET ORAL EVERY 6 HOURS
Refills: 0 | Status: DISCONTINUED | OUTPATIENT
Start: 2024-05-27 | End: 2024-05-29

## 2024-05-27 RX ADMIN — Medication 975 MILLIGRAM(S): at 19:10

## 2024-05-27 RX ADMIN — Medication 975 MILLIGRAM(S): at 10:30

## 2024-05-27 RX ADMIN — HEPARIN SODIUM 5000 UNIT(S): 5000 INJECTION INTRAVENOUS; SUBCUTANEOUS at 05:27

## 2024-05-27 RX ADMIN — SIMETHICONE 80 MILLIGRAM(S): 80 TABLET, CHEWABLE ORAL at 14:39

## 2024-05-27 RX ADMIN — Medication 30 MILLIGRAM(S): at 05:27

## 2024-05-27 RX ADMIN — SIMETHICONE 80 MILLIGRAM(S): 80 TABLET, CHEWABLE ORAL at 05:27

## 2024-05-27 RX ADMIN — Medication 30 MILLIGRAM(S): at 06:00

## 2024-05-27 RX ADMIN — Medication 30 MILLIGRAM(S): at 00:50

## 2024-05-27 RX ADMIN — SIMETHICONE 80 MILLIGRAM(S): 80 TABLET, CHEWABLE ORAL at 22:09

## 2024-05-27 RX ADMIN — Medication 30 MILLIGRAM(S): at 12:33

## 2024-05-27 RX ADMIN — OXYCODONE HYDROCHLORIDE 5 MILLIGRAM(S): 5 TABLET ORAL at 20:49

## 2024-05-27 RX ADMIN — Medication 975 MILLIGRAM(S): at 09:35

## 2024-05-27 RX ADMIN — HEPARIN SODIUM 5000 UNIT(S): 5000 INJECTION INTRAVENOUS; SUBCUTANEOUS at 18:12

## 2024-05-27 RX ADMIN — SIMETHICONE 80 MILLIGRAM(S): 80 TABLET, CHEWABLE ORAL at 09:35

## 2024-05-27 RX ADMIN — Medication 600 MILLIGRAM(S): at 23:16

## 2024-05-27 RX ADMIN — OXYCODONE HYDROCHLORIDE 5 MILLIGRAM(S): 5 TABLET ORAL at 19:49

## 2024-05-27 RX ADMIN — Medication 30 MILLIGRAM(S): at 13:30

## 2024-05-27 RX ADMIN — MAGNESIUM HYDROXIDE 30 MILLILITER(S): 400 TABLET, CHEWABLE ORAL at 09:34

## 2024-05-27 RX ADMIN — Medication 1 TABLET(S): at 12:33

## 2024-05-27 RX ADMIN — Medication 325 MILLIGRAM(S): at 12:33

## 2024-05-27 RX ADMIN — Medication 1 MILLIGRAM(S): at 12:33

## 2024-05-27 RX ADMIN — Medication 975 MILLIGRAM(S): at 18:12

## 2024-05-28 LAB
BASOPHILS # BLD AUTO: 0.03 K/UL — SIGNIFICANT CHANGE UP (ref 0–0.2)
BASOPHILS NFR BLD AUTO: 0.2 % — SIGNIFICANT CHANGE UP (ref 0–2)
EOSINOPHIL # BLD AUTO: 0.07 K/UL — SIGNIFICANT CHANGE UP (ref 0–0.5)
EOSINOPHIL NFR BLD AUTO: 0.5 % — SIGNIFICANT CHANGE UP (ref 0–6)
HCT VFR BLD CALC: 31.4 % — LOW (ref 34.5–45)
HGB BLD-MCNC: 10 G/DL — LOW (ref 11.5–15.5)
IMM GRANULOCYTES NFR BLD AUTO: 0.5 % — SIGNIFICANT CHANGE UP (ref 0–0.9)
LYMPHOCYTES # BLD AUTO: 1.86 K/UL — SIGNIFICANT CHANGE UP (ref 1–3.3)
LYMPHOCYTES # BLD AUTO: 14.5 % — SIGNIFICANT CHANGE UP (ref 13–44)
MCHC RBC-ENTMCNC: 28.3 PG — SIGNIFICANT CHANGE UP (ref 27–34)
MCHC RBC-ENTMCNC: 31.8 GM/DL — LOW (ref 32–36)
MCV RBC AUTO: 89 FL — SIGNIFICANT CHANGE UP (ref 80–100)
MONOCYTES # BLD AUTO: 0.8 K/UL — SIGNIFICANT CHANGE UP (ref 0–0.9)
MONOCYTES NFR BLD AUTO: 6.2 % — SIGNIFICANT CHANGE UP (ref 2–14)
NEUTROPHILS # BLD AUTO: 10.02 K/UL — HIGH (ref 1.8–7.4)
NEUTROPHILS NFR BLD AUTO: 78.1 % — HIGH (ref 43–77)
NRBC # BLD: 0 /100 WBCS — SIGNIFICANT CHANGE UP (ref 0–0)
PLATELET # BLD AUTO: 193 K/UL — SIGNIFICANT CHANGE UP (ref 150–400)
RBC # BLD: 3.53 M/UL — LOW (ref 3.8–5.2)
RBC # FLD: 16.6 % — HIGH (ref 10.3–14.5)
WBC # BLD: 12.85 K/UL — HIGH (ref 3.8–10.5)
WBC # FLD AUTO: 12.85 K/UL — HIGH (ref 3.8–10.5)

## 2024-05-28 RX ADMIN — Medication 975 MILLIGRAM(S): at 02:51

## 2024-05-28 RX ADMIN — Medication 975 MILLIGRAM(S): at 09:01

## 2024-05-28 RX ADMIN — MAGNESIUM HYDROXIDE 30 MILLILITER(S): 400 TABLET, CHEWABLE ORAL at 20:55

## 2024-05-28 RX ADMIN — Medication 975 MILLIGRAM(S): at 21:49

## 2024-05-28 RX ADMIN — OXYCODONE HYDROCHLORIDE 5 MILLIGRAM(S): 5 TABLET ORAL at 09:00

## 2024-05-28 RX ADMIN — Medication 600 MILLIGRAM(S): at 00:16

## 2024-05-28 RX ADMIN — OXYCODONE HYDROCHLORIDE 5 MILLIGRAM(S): 5 TABLET ORAL at 15:11

## 2024-05-28 RX ADMIN — HEPARIN SODIUM 5000 UNIT(S): 5000 INJECTION INTRAVENOUS; SUBCUTANEOUS at 05:53

## 2024-05-28 RX ADMIN — Medication 975 MILLIGRAM(S): at 15:12

## 2024-05-28 RX ADMIN — Medication 600 MILLIGRAM(S): at 12:06

## 2024-05-28 RX ADMIN — Medication 325 MILLIGRAM(S): at 12:21

## 2024-05-28 RX ADMIN — SIMETHICONE 80 MILLIGRAM(S): 80 TABLET, CHEWABLE ORAL at 20:55

## 2024-05-28 RX ADMIN — Medication 975 MILLIGRAM(S): at 03:51

## 2024-05-28 RX ADMIN — Medication 600 MILLIGRAM(S): at 17:36

## 2024-05-28 RX ADMIN — Medication 600 MILLIGRAM(S): at 07:34

## 2024-05-28 RX ADMIN — Medication 975 MILLIGRAM(S): at 20:49

## 2024-05-28 RX ADMIN — OXYCODONE HYDROCHLORIDE 5 MILLIGRAM(S): 5 TABLET ORAL at 09:57

## 2024-05-28 RX ADMIN — Medication 975 MILLIGRAM(S): at 09:57

## 2024-05-28 RX ADMIN — Medication 975 MILLIGRAM(S): at 16:17

## 2024-05-28 RX ADMIN — SIMETHICONE 80 MILLIGRAM(S): 80 TABLET, CHEWABLE ORAL at 12:05

## 2024-05-28 RX ADMIN — OXYCODONE HYDROCHLORIDE 5 MILLIGRAM(S): 5 TABLET ORAL at 20:49

## 2024-05-28 RX ADMIN — OXYCODONE HYDROCHLORIDE 5 MILLIGRAM(S): 5 TABLET ORAL at 05:36

## 2024-05-28 RX ADMIN — Medication 600 MILLIGRAM(S): at 18:36

## 2024-05-28 RX ADMIN — Medication 600 MILLIGRAM(S): at 06:22

## 2024-05-28 RX ADMIN — Medication 1 TABLET(S): at 12:05

## 2024-05-28 RX ADMIN — OXYCODONE HYDROCHLORIDE 5 MILLIGRAM(S): 5 TABLET ORAL at 04:36

## 2024-05-28 RX ADMIN — Medication 600 MILLIGRAM(S): at 13:05

## 2024-05-28 RX ADMIN — OXYCODONE HYDROCHLORIDE 5 MILLIGRAM(S): 5 TABLET ORAL at 21:49

## 2024-05-28 RX ADMIN — Medication 1 MILLIGRAM(S): at 12:05

## 2024-05-28 RX ADMIN — SIMETHICONE 80 MILLIGRAM(S): 80 TABLET, CHEWABLE ORAL at 02:51

## 2024-05-28 RX ADMIN — Medication 600 MILLIGRAM(S): at 23:42

## 2024-05-28 RX ADMIN — OXYCODONE HYDROCHLORIDE 5 MILLIGRAM(S): 5 TABLET ORAL at 16:17

## 2024-05-29 ENCOUNTER — TRANSCRIPTION ENCOUNTER (OUTPATIENT)
Age: 29
End: 2024-05-29

## 2024-05-29 VITALS
DIASTOLIC BLOOD PRESSURE: 80 MMHG | SYSTOLIC BLOOD PRESSURE: 112 MMHG | RESPIRATION RATE: 18 BRPM | TEMPERATURE: 98 F | HEART RATE: 93 BPM | OXYGEN SATURATION: 98 %

## 2024-05-29 PROCEDURE — 36415 COLL VENOUS BLD VENIPUNCTURE: CPT

## 2024-05-29 PROCEDURE — 86780 TREPONEMA PALLIDUM: CPT

## 2024-05-29 PROCEDURE — 85025 COMPLETE CBC W/AUTO DIFF WBC: CPT

## 2024-05-29 PROCEDURE — 85384 FIBRINOGEN ACTIVITY: CPT

## 2024-05-29 PROCEDURE — 85610 PROTHROMBIN TIME: CPT

## 2024-05-29 PROCEDURE — 86703 HIV-1/HIV-2 1 RESULT ANTBDY: CPT

## 2024-05-29 PROCEDURE — 86762 RUBELLA ANTIBODY: CPT

## 2024-05-29 PROCEDURE — 86803 HEPATITIS C AB TEST: CPT

## 2024-05-29 PROCEDURE — 86923 COMPATIBILITY TEST ELECTRIC: CPT

## 2024-05-29 PROCEDURE — 85730 THROMBOPLASTIN TIME PARTIAL: CPT

## 2024-05-29 PROCEDURE — 87389 HIV-1 AG W/HIV-1&-2 AB AG IA: CPT

## 2024-05-29 PROCEDURE — 87340 HEPATITIS B SURFACE AG IA: CPT

## 2024-05-29 PROCEDURE — 86900 BLOOD TYPING SEROLOGIC ABO: CPT

## 2024-05-29 PROCEDURE — 86765 RUBEOLA ANTIBODY: CPT

## 2024-05-29 PROCEDURE — 86850 RBC ANTIBODY SCREEN: CPT

## 2024-05-29 PROCEDURE — 59050 FETAL MONITOR W/REPORT: CPT

## 2024-05-29 PROCEDURE — 86901 BLOOD TYPING SEROLOGIC RH(D): CPT

## 2024-05-29 PROCEDURE — 86735 MUMPS ANTIBODY: CPT

## 2024-05-29 RX ORDER — FAMOTIDINE 10 MG/ML
1 INJECTION INTRAVENOUS
Qty: 20 | Refills: 0
Start: 2024-05-29 | End: 2024-06-07

## 2024-05-29 RX ORDER — ACETAMINOPHEN 500 MG
2 TABLET ORAL
Qty: 40 | Refills: 1
Start: 2024-05-29 | End: 2024-06-07

## 2024-05-29 RX ORDER — IBUPROFEN 200 MG
1 TABLET ORAL
Qty: 20 | Refills: 0
Start: 2024-05-29 | End: 2024-06-02

## 2024-05-29 RX ORDER — FERROUS SULFATE 325(65) MG
1 TABLET ORAL
Qty: 30 | Refills: 0
Start: 2024-05-29 | End: 2024-06-27

## 2024-05-29 RX ORDER — SENNOSIDES/DOCUSATE SODIUM 8.6MG-50MG
1 TABLET ORAL
Qty: 60 | Refills: 0
Start: 2024-05-29 | End: 2024-06-27

## 2024-05-29 RX ORDER — DOCUSATE SODIUM 100 MG
1 CAPSULE ORAL
Qty: 30 | Refills: 0
Start: 2024-05-29 | End: 2024-06-27

## 2024-05-29 RX ORDER — ACETAMINOPHEN 500 MG
2 TABLET ORAL
Qty: 40 | Refills: 0
Start: 2024-05-29 | End: 2024-06-02

## 2024-05-29 RX ORDER — SENNA PLUS 8.6 MG/1
2 TABLET ORAL
Qty: 20 | Refills: 0
Start: 2024-05-29 | End: 2024-06-07

## 2024-05-29 RX ORDER — ASCORBIC ACID 60 MG
1 TABLET,CHEWABLE ORAL
Qty: 30 | Refills: 0
Start: 2024-05-29 | End: 2024-06-27

## 2024-05-29 RX ORDER — SIMETHICONE 80 MG/1
1 TABLET, CHEWABLE ORAL
Qty: 30 | Refills: 0
Start: 2024-05-29 | End: 2024-06-02

## 2024-05-29 RX ADMIN — Medication 600 MILLIGRAM(S): at 12:18

## 2024-05-29 RX ADMIN — Medication 975 MILLIGRAM(S): at 09:24

## 2024-05-29 RX ADMIN — OXYCODONE HYDROCHLORIDE 5 MILLIGRAM(S): 5 TABLET ORAL at 13:16

## 2024-05-29 RX ADMIN — MAGNESIUM HYDROXIDE 30 MILLILITER(S): 400 TABLET, CHEWABLE ORAL at 06:20

## 2024-05-29 RX ADMIN — Medication 600 MILLIGRAM(S): at 06:21

## 2024-05-29 RX ADMIN — Medication 1 TABLET(S): at 12:19

## 2024-05-29 RX ADMIN — Medication 975 MILLIGRAM(S): at 02:30

## 2024-05-29 RX ADMIN — Medication 1 MILLIGRAM(S): at 12:18

## 2024-05-29 RX ADMIN — OXYCODONE HYDROCHLORIDE 5 MILLIGRAM(S): 5 TABLET ORAL at 18:18

## 2024-05-29 RX ADMIN — SIMETHICONE 80 MILLIGRAM(S): 80 TABLET, CHEWABLE ORAL at 18:18

## 2024-05-29 RX ADMIN — SIMETHICONE 80 MILLIGRAM(S): 80 TABLET, CHEWABLE ORAL at 12:19

## 2024-05-29 RX ADMIN — Medication 975 MILLIGRAM(S): at 03:30

## 2024-05-29 RX ADMIN — Medication 975 MILLIGRAM(S): at 16:55

## 2024-05-29 RX ADMIN — SIMETHICONE 80 MILLIGRAM(S): 80 TABLET, CHEWABLE ORAL at 06:20

## 2024-05-29 RX ADMIN — HEPARIN SODIUM 5000 UNIT(S): 5000 INJECTION INTRAVENOUS; SUBCUTANEOUS at 06:20

## 2024-05-29 RX ADMIN — Medication 975 MILLIGRAM(S): at 08:24

## 2024-05-29 RX ADMIN — Medication 600 MILLIGRAM(S): at 00:42

## 2024-05-29 RX ADMIN — Medication 975 MILLIGRAM(S): at 15:55

## 2024-05-29 RX ADMIN — OXYCODONE HYDROCHLORIDE 5 MILLIGRAM(S): 5 TABLET ORAL at 14:16

## 2024-05-29 RX ADMIN — Medication 325 MILLIGRAM(S): at 12:19

## 2024-05-29 RX ADMIN — Medication 600 MILLIGRAM(S): at 08:00

## 2024-05-29 RX ADMIN — Medication 600 MILLIGRAM(S): at 13:18

## 2024-05-29 NOTE — LACTATION INITIAL EVALUATION - INTERVENTION OUTCOME
demonstrates understanding of teaching/good return demonstration/needs met
verbalizes understanding/demonstrates understanding of teaching/good return demonstration/Lactation team to follow up

## 2024-05-29 NOTE — PROGRESS NOTE ADULT - PROBLEM SELECTOR PROBLEM 1
delivery delivered
Category II fetal heart rate tracing during labor and delivery
 delivery delivered
 delivery delivered

## 2024-05-29 NOTE — DISCHARGE NOTE OB - CARE PLAN
Principal Discharge DX:	 delivery delivered  Assessment and plan of treatment:	Continue breastfeeding.  Motrin as needed for pain.  Ambulate daily.  No heavy lifting or anything per vagina x 6 weeks - no sex, tampons, douching, tub baths, etc.  Follow up in office in 2 weeks for incision check, and then at 6 weeks for postpartum check.  Secondary Diagnosis:	Acute on chronic blood loss anemia  Assessment and plan of treatment:	take iron, folic acid, vitamin C, and prenatal vitamins. eat iron fortified food   1

## 2024-05-29 NOTE — PROGRESS NOTE ADULT - ASSESSMENT
A/P: POD #3 s/p prim  c/s for CAT II  @ 39 weeks 2 days. Will keep patient for pain management.     - continue pp care   - continue pain management prn   - reg diet   - continue heparin   -d/w Tracy jiménez 
PA NOTE:    POD#0    s/p: prim cs for cat Ii    pt doing well; pain well controlled    -Pain management as needed  -Advance diet per protocol  -OOB and ambulate   -cbc routine post op  -chandler output   -Advance diet as tolerated  -Encourage breastfeeding   -d/w Dr anthony 
A/P: POD #1 s/p primary c/s @ 39w2d, cat II tracing/FTP , pt stable  
A/P: POD #2 s/p primary c/s

## 2024-05-29 NOTE — DISCHARGE NOTE OB - NS MD DC FALL RISK RISK
For information on Fall & Injury Prevention, visit: [email or web address] OR  [email or web address] OR  https://www.cdc.gov/steadi/patient.html

## 2024-05-29 NOTE — DISCHARGE NOTE OB - MEDICATION SUMMARY - MEDICATIONS TO TAKE
I will START or STAY ON the medications listed below when I get home from the hospital:    ibuprofen 600 mg oral tablet  -- 1 tab(s) by mouth every 6 hours  -- Indication: For pain    Tylenol Extra Strength 500 mg oral tablet  -- 2 tab(s) by mouth every 6 hours  -- Indication: For pain    PreNatal Low Iron oral tablet  -- 1 tab(s) by mouth once a day  -- Indication: For Supplementation    ferrous sulfate 325 mg (65 mg elemental iron) oral tablet  -- 1 tab(s) by mouth once a day  -- Indication: For anemia     Colace 100 mg oral capsule  -- 1 cap(s) by mouth once a day  -- Indication: For Constipation

## 2024-05-29 NOTE — DISCHARGE NOTE OB - PATIENT PORTAL LINK FT
You can access the FollowMyHealth Patient Portal offered by [facility] by registering at the following website: [email or web address]. By joining [facility]’s FollowMyHealth portal, you will also be able to view your health information using other applications (apps) compatible with our system.

## 2024-05-29 NOTE — LACTATION INITIAL EVALUATION - AS DELIV COMPLICATIONS OB
see delivery summary/abnormal fetal heart rate tracing/meconium stained fluid
see delivery summary/abnormal fetal heart rate tracing/meconium stained fluid

## 2024-05-29 NOTE — PROGRESS NOTE ADULT - SUBJECTIVE AND OBJECTIVE BOX
Patient seen sitting in chair, complaining of gas pain. Just started ambulating, chandler just removed no void spontaneously yet.  + flatus;  no bm; tolerating clr liq diet.  Pt both breast and bottle feeding. Pt denies HA, blurry vision or epigastric pain, CP, SOB, N/V/D,  dizziness, palpitations, worsening vaginal bleeding.    Vital Signs Last 24 Hrs  T(C): 37.1 (27 May 2024 05:30), Max: 37.8 (26 May 2024 11:31)  T(F): 98.7 (27 May 2024 05:30), Max: 100 (26 May 2024 11:31)  HR: 97 (27 May 2024 05:30) (82 - 98)  BP: 105/60 (27 May 2024 05:30) (100/65 - 121/76)  BP(mean): 86 (26 May 2024 16:00) (78 - 88)  RR: 16 (27 May 2024 05:30) (16 - 16)  SpO2: 97% (27 May 2024 05:30) (94% - 100%)    Parameters below as of 27 May 2024 05:30  Patient On (Oxygen Delivery Method): room air        Gen: A&O x 3, NAD  Chest: CTA B/L  Cardiac: S1,S2  RRR  Breast: Soft, nontender, nonengorged  Abdomen: +BS; soft; Nontender, mildly distended; dressing removed incision C/D/I steri strips in place  Gyn: minimal lochia   Extremities: Nontender, venodynes in place                          9.9    14.71 )-----------( 168      ( 27 May 2024 06:05 )             30.2       
Patient seen at bedside resting comfortably offers no new complaints. + Ambulation, + void without difficulty, + flatus;  + bm; tolerating regular diet. both breastfeeding and bottle feeding. Denies HA, blurry vision or epigastric pain, CP, SOB, N/V/D,  dizziness, palpitations, worsening vaginal bleeding.    Vital Signs Last 24 Hrs  T(C): 36.6 (28 May 2024 05:57), Max: 37.2 (27 May 2024 10:00)  T(F): 97.9 (28 May 2024 05:57), Max: 98.9 (27 May 2024 10:00)  HR: 98 (28 May 2024 05:57) (89 - 98)  BP: 99/67 (28 May 2024 05:57) (99/67 - 107/72)  BP(mean): --  RR: 17 (28 May 2024 05:57) (16 - 17)  SpO2: 98% (28 May 2024 05:57) (96% - 98%)    Parameters below as of 28 May 2024 05:57  Patient On (Oxygen Delivery Method): room air        Gen: A&O x 3, NAD  Chest: CTABL  Cardiac: S1, S2, RRR  Breast: Soft, nontender, nonengorged  Abdomen: +BS; soft; Nontender, nondistended, Incision C/D/I steri strips in place   Gyn: Minimal lochia  Extremities: Nontender, DTRS 2+, no worsening edema                          10.0   12.85 )-----------( 193      ( 28 May 2024 07:12 )             31.4          
PA NOTE:    POD#0    s/p: prim cs for cat Ii    pt doing well; pain well controlled    Patient seen at bedside, resting comfortably offers no new complaints. Due to ambulate, Chandler in place and due to be removed as routine post  tolerating clear diet at this time.  Attempting breastfeeding.  Denies HA, CP, SOB, N/V/D, dizziness, palpitations, worsening abdominal pain, worsening vaginal bleeding, or any other concerns.      Vital Signs Last 24 Hrs  T(C): 36.9 (26 May 2024 17:00), Max: 37.8 (26 May 2024 11:31)  T(F): 98.5 (26 May 2024 17:00), Max: 100 (26 May 2024 11:31)  HR: 88 (26 May 2024 17:00) (82 - 95)  BP: 113/74 (26 May 2024 17:00) (110/72 - 121/76)  BP(mean): 86 (26 May 2024 16:00) (78 - 88)  RR: 16 (26 May 2024 17:00) (16 - 19)  SpO2: 98% (26 May 2024 17:00) (98% - 100%)    Parameters below as of 26 May 2024 17:00  Patient On (Oxygen Delivery Method): room air      CAPILLARY BLOOD GLUCOSE          Gen: A&O x 3, NAD  Chest: CTABL  Cardiac: S1+S2+ RRR  Breast: Soft, nontender, nonengorged  Abdomen: +BS; soft; NT; ND   Dressing C/D/I- no evidence of oozing  Gyn: Minimal lochia  chandler clear yellow  Ext: Nontender, DTRS 2+, no worsening edema, Venodyne intact                  
PA NOTE:    Patient seen at [facility] complains of increased abdominal pain and breast pain.  + Ambulation, + void without difficulty, states that she had BM on POD 1 but has felt constipated since;  tolerating regular diet.    Denies HA, CP, SOB, N/V/D,  dizziness, palpitations, worsening abdominal pain, worsening vaginal bleeding, or any other concerns.     Vital Signs Last 24 Hrs  T(C): 36.5 (29 May 2024 06:23), Max: 36.5 (28 May 2024 18:49)  T(F): 97.7 (29 May 2024 06:23), Max: 97.7 (28 May 2024 18:49)  HR: 93 (29 May 2024 06:23) (93 - 93)  BP: 112/80 (29 May 2024 06:23) (111/78 - 112/80)  BP(mean): --  RR: 18 (29 May 2024 06:23) (17 - 18)  SpO2: 98% (29 May 2024 06:23) (98% - 99%)    Parameters below as of 29 May 2024 06:23  Patient On (Oxygen Delivery Method): room air        Gen: A&O x 3, NAD  Chest: saturating well on RA   Breast: firm, tender, engorged  Abdomen:  soft; Nontender, nondistended,  Incision C/D/I   Gyn: Minimal lochia  Extremities: Nontender,  no worsening edema    CBC Full  -  ( 28 May 2024 07:12 )  WBC Count : 12.85 K/uL  RBC Count : 3.53 M/uL  Hemoglobin : 10.0 g/dL  Hematocrit : 31.4 %  Platelet Count - Automated : 193 K/uL  Mean Cell Volume : 89.0 fl  Mean Cell Hemoglobin : 28.3 pg  Mean Cell Hemoglobin Concentration : 31.8 gm/dL  Auto Neutrophil # : 10.02 K/uL  Auto Lymphocyte # : 1.86 K/uL  Auto Monocyte # : 0.80 K/uL  Auto Eosinophil # : 0.07 K/uL  Auto Basophil # : 0.03 K/uL  Auto Neutrophil % : 78.1 %  Auto Lymphocyte % : 14.5 %  Auto Monocyte % : 6.2 %  Auto Eosinophil % : 0.5 %  Auto Basophil % : 0.2 %

## 2024-05-29 NOTE — DISCHARGE NOTE OB - CARE PROVIDER_API CALL
Jesus Molina  Obstetrics and Gynecology  [address]  [place] [address]  Phone: [phone]  Fax: [phone]  Follow Up Time: 1 week

## 2024-05-29 NOTE — PROGRESS NOTE ADULT - PROBLEM SELECTOR PLAN 1
- continue pp care   - continue pain management prn   - reg diet   - continue heparin   -d/w Trcay jiménez
-Pain management as needed  -cont post op care  -OOB and ambulate  -encourage insentive spirometer use  -Encourage breastfeeding   -d/w dr Kowalski
-Pain management as needed  -OOB and ambulate  -f/u Rpt CBC   - f/u void  -Advance diet to regular  -Encourage breastfeeding   -d/w Dr. Molina

## 2024-05-29 NOTE — LACTATION INITIAL EVALUATION - LACTATION INTERVENTIONS
pt. with baby in NICU;  Mom taught hand expression; has an electric breast pump.  Reviewed Instructions given for use, frequency and duration, collection and storage and cleaning of kit parts.  Reinforced importance of stimulating/expressing 8-12X/24 hours for the establishment, maintenance and expression of breastmilk.  Encouraged to visit/breastfeed baby in NICU as able/initiate/review hand expression/initiate/review pumping guidelines and safe milk handling/review techniques to increase milk supply
Baby remains in NICU; pt. c/o breast discomfort; nreat noted to be firm and tender to touch, without redness nor fever; pt. has hospital grade electric pump at bedside however, has not been expressing with frequency recommended; reviewed hand expression, provided with warm compress to soften breast and recommended warm shower and recommended cold compressess for 5 min/breat after expression to decrease swelling; pt. was able to return demonstrate and express colostrum; encouraged to visit baby in NICU and provide her EHM and/or breastfeed as able; pt. noted to be crying and teary eyed this morning and was contemplating stopping breastfeeding/expression due to  "fear of pain" discussed benefits of breast milk for baby and impt. of emptying breast and maintaining soft to decrease discomfort; pt. agreed, took showe and expressed breast with some relief and was smiling and went to visit baby in NICU; pt. scheduled for possible d/c home this evening/initiate/review hand expression/initiate/review pumping guidelines and safe milk handling/review techniques to manage sore nipples/engorgement

## 2024-05-29 NOTE — LACTATION INITIAL EVALUATION - NIPPLE ASSESSMENT (LEFT)
Fax received from [facility] requesting provider deya from [facility].   
normal/dry and intact
dry and intact

## 2024-05-29 NOTE — DISCHARGE NOTE OB - MEDICATION SUMMARY - MEDICATIONS TO STOP TAKING
I will STOP taking the medications listed below when I get home from the hospital:    cephalexin 500 mg oral tablet  -- 1 tab(s) by mouth 2 times a day    promethazine 12.5 mg rectal suppository  -- 1 suppository(ies) rectally every 8 hours as needed for  nausea    ondansetron 4 mg oral tablet  -- 1 tab(s) by mouth every 6 hours as needed for  nausea

## 2024-12-24 NOTE — OB RN TRIAGE NOTE - NS_MODEOFARRIVAL_OBGYN_ALL_OB
Refill: 0    4. Chronic pain of right wrist  Chronic and controlled with oxycodone as needed.  Dose increased due to new pain associated with liver tumors.  - oxyCODONE HCl (OXY-IR) 10 MG immediate release tablet; Take 1 tablet by mouth every 8 hours as needed for Pain for up to 30 days. Max Daily Amount: 30 mg  Dispense: 90 tablet; Refill: 0    5. Dermatitis  Use Nizoral cream as needed  - ketoconazole (NIZORAL) 2 % cream; apply to affected area twice a day  Dispense: 60 g; Refill: 5    6. Mild intermittent reactive airways dysfunction syndrome with acute exacerbation (HCC)  Use inhaler or nebulizer as needed.  Symptoms are controlled.    7. Mixed hyperlipidemia  Stable on atorvastatin.  Labs up-to-date.  - atorvastatin (LIPITOR) 40 MG tablet; take 1 tablet by mouth once daily  Dispense: 90 tablet; Refill: 1    8. Encounter for screening mammogram for malignant neoplasm of breast    - MANFRED DIGITAL SCREEN W OR WO CAD BILATERAL; Future       Norma received counseling on the following healthy behaviors: nutrition, exercise, and medication adherence  Reviewed prior labs and health maintenance.  Continue current medications, diet and exercise.  Discussed use, benefit, and side effects of prescribed medications. Barriers to medication compliance addressed.   Patient given educational materials - see patient instructions.    All patient questions answered.  Patient voiced understanding.      Return in about 3 months (around 3/24/2025) for chronic pain, htn.        Electronically signed by Angie Zepeda MD on 12/24/24 at 9:00 AM EST  
Car

## 2025-02-22 NOTE — OB RN PATIENT PROFILE - NS_PRENATALSTART_OBGYN_ALL_OB
Patient states that Dupixent seems to be working well in relieving symptoms.     Patient was instructed to inject medication on a different site every two weeks. Thigh, abdomen. Only inject in upper arm if someone else is injecting it for you.   Epipen prescribed.     Added Montelukast 10 mg to orally once nightly to reduce nighttime stuffiness.   
Started first trimester

## 2025-05-27 NOTE — OB PROVIDER TRIAGE NOTE - NSICDXFAMHXNEG_GEN_ALL
----- Message from Eric Odom MD sent at 5/23/2025  5:36 PM CDT -----  On June 20 th, I currently have an 1140 spot.  Please reschedule Mr. Gilebrt for the 1140 spot. Thanks   none

## 2025-07-16 NOTE — DISCHARGE NOTE OB - AVOID DRIVING FOR 1-2 WEEKS
FOLLOW UP VISIT    Verbal permission granted by patient to leave a detailed message with medical information at phone number listed in Epic. yes    Patient is female, are you pregnant or breastfeeding ? no    Patient is here alone today     Chief Complaint   Patient presents with    Skin Assessment    Office Visit        HISTORY OF PRESENT ILLNESS: The patient is a 33 year old  female here today for a full skin exam.   Last visit was 6/14/24.    No  skin concerns; nothing on the skin that is bleeding, ulcerating, painful, growing or changing.      DERM-RELEVANT HISTORY:  History of skin cancer-Negative    dysplastic nevus with mild to focally moderate atypia left upper back,  biopsied 6/12/23, punch excision 12/29/23 negative for residual nevus    Family history of skin cancer--maternal grandmother melanoma   History of tanning bed use- yes 20 or less in past  Outdoor hobbies-walking, volleyball  Sun protective measures-spf 30, baseball cap  Occupation-RN   Verbal consent obtained to examine genitals, buttocks yes.    PAST MEDICAL HISTORY:  [x]  None  []  Melanoma    []  Asthma []  Skin Cancer  []  Eczema []  Keloids  []  Allergies []  Psoriasis  []  Vitiligo        REVIEW OF SYSTEMS:  Yes No  []  [x]  Other skin concerns, rash, or other changing lesions  []  [x]  Fevers, current, or recent illness  []  [x]  Easy bruising or bleeding    I have reviewed the past medical history, family history, social history, medications and allergies listed in the medical record as obtained by my nursing staff and support staff and agree with their documentation      PHYSICAL EXAM:   There were no vitals filed for this visit.  GENERAL:  Pleasant, alert, no acute distress, well-groomed.    Skin: Examination of the scalp/body hair, face, neck, ears, eyelids/conjunctiva, lips/oral mucosa, chest, back, abdomen, right upper extremity, left upper extremity, right lower extremity, left lower extremity, digits/nails and  genitals/buttocks was performed and findings were within normal limits unless specified below.     Findings include:  -scar left upper back, no pigment of concern  -toenails and fingernails painted   - 2-8 mm brown and ppink macules and thin papules with regular borders and pigment on the trunk > extremities, increased # compared to average  -pink and brown 8 mm firm papule right upper arm, size unchanged, similar to prior photo  -verrucous papule left medial forefoot  - brown macules with feathered borders on shoulders  -Fingernails and toenails painted  -Hyperkeratosis bilateral dorsal great toes    Findings include:   -scar left upper back, no evidence of recurrence  -pink and brown 8 mm firm papule right upper arm similar to prior photo,  size unchanged DF  -toenails and fingernails painted  -brown 3 mm macule with slight pigment irregularity right antihelix  -brown 4 mm macule with slight pigment irregularity right helical rim           ASSESSMENT AND PLAN:   Neoplasms of uncertain behavior of the skin:   right antihelix-Ddx nevus vs atypical pigmented lesion.  right helical rim-Ddx nevus vs atypical pigmented lesion.   Discussed biopsy vs monitoring. Shave biopsies performed today as detailed below.     Neoplasm of uncertain behavior of the skin:   Right upper arm-Ddx dermatofibroma. Call clinic with change.      History of dysplastic nevus with mild to focally moderate atypia left upper back: As per Derm relevant history above.  No evidence of persistence/recurrence.  Provided reassurance.  Call clinic with change/recurrence.    Multiple benign nevi: On the trunk > extremities, benign and uniform appearing with regular borders and pigment.  Increased number compared to average.  Provided reassurance. Discussed the ABCDEs of pigmented lesions.     Verruca: Left medial forefoot. Discussed benign, persistent, viral nature. Discussed treatment options.  She will treat with over-the-counter salicylic acid under  occlusion of bandage.    Lentigines: Shoulders. Discussed benign nature. Provided reassurance.     Hyperkeratosis: Bilateral dorsal great toes.  Discussed use of Discussed use of OTC cream with salicylic and lactic acid. Recommendation provided.     Discussed concerning signs/symptoms of basal cell carcinoma and squamous cell carcinoma. Recommended calling with concerning skin changes. Discussed sun protection/avoidance.    SHAVE BIOPSY PROCEDURE NOTE  Verbal informed consent was obtained after discussing risks including pain, bleeding, infection, recurrence, and scarring. The right antihelix, right helical rim(site) was sterilely prepped with alcohol, which was allowed to dry completely, and then locally infiltrated with 1% lidocaine with  epinephrine, {inj amount:034133} total. A shave biopsy was obtained using a blue blade and the specimen was sent to pathology. Hemostasis was obtained with aluminum chloride. Petrolatum ointment and a clean dressing were applied. The patient tolerated the procedure well without complications. Verbal and written wound care instructions were given.     On 7/16/2025, Purvi BONNER MA scribed the services personally performed by Izzy Nowak MD     Return 1 year    *** total minutes were spent in direct counseling and coordinating of care with patient about the above diagnoses.    ***            Statement Selected